# Patient Record
Sex: MALE | Race: WHITE | URBAN - METROPOLITAN AREA
[De-identification: names, ages, dates, MRNs, and addresses within clinical notes are randomized per-mention and may not be internally consistent; named-entity substitution may affect disease eponyms.]

---

## 2017-02-25 DIAGNOSIS — I15.9 SECONDARY HYPERTENSION WITH GOAL BLOOD PRESSURE LESS THAN 130/85: ICD-10-CM

## 2017-02-28 ENCOUNTER — OFFICE VISIT (OUTPATIENT)
Dept: INTERNAL MEDICINE CLINIC | Age: 52
End: 2017-02-28

## 2017-02-28 VITALS
HEIGHT: 68 IN | WEIGHT: 212.8 LBS | HEART RATE: 64 BPM | TEMPERATURE: 97.2 F | SYSTOLIC BLOOD PRESSURE: 122 MMHG | OXYGEN SATURATION: 96 % | RESPIRATION RATE: 16 BRPM | DIASTOLIC BLOOD PRESSURE: 80 MMHG | BODY MASS INDEX: 32.25 KG/M2

## 2017-02-28 DIAGNOSIS — E78.2 MIXED HYPERLIPIDEMIA: ICD-10-CM

## 2017-02-28 DIAGNOSIS — I10 BENIGN HYPERTENSION: Primary | ICD-10-CM

## 2017-02-28 DIAGNOSIS — R73.09 ELEVATED GLUCOSE: ICD-10-CM

## 2017-02-28 DIAGNOSIS — Z79.899 ENCOUNTER FOR LONG-TERM (CURRENT) DRUG USE: ICD-10-CM

## 2017-02-28 PROBLEM — E66.9 OBESITY (BMI 30-39.9): Status: ACTIVE | Noted: 2017-02-28

## 2017-02-28 RX ORDER — INDOMETHACIN 50 MG/1
CAPSULE ORAL
Refills: 2 | COMMUNITY
Start: 2016-12-15 | End: 2018-07-30 | Stop reason: ALTCHOICE

## 2017-02-28 NOTE — MR AVS SNAPSHOT
Visit Information Date & Time Provider Department Dept. Phone Encounter #  
 2/28/2017  9:00 AM Guanaco Meier MD [place] [address] [facility] [phone] Follow-up Instructions Return in about 6 months (around 8/28/2017). Upcoming Health Maintenance Date Due COLONOSCOPY 2/12/1981 DTaP/Tdap/Td series (1 - Tdap) 2/12/1984 Allergies as of 2/28/2017  Review Complete On: 2/28/2017 By: Guanaco Meier MD  
 No Known Allergies Current Immunizations  Reviewed on 2/28/2017 No immunizations on file. Reviewed by Guanaco Meier MD on 2/28/2017 at  9:22 AM  
You Were Diagnosed With   
  
 Codes Comments Mixed hyperlipidemia    -  Primary ICD-10-CM: B83.4 ICD-9-CM: 272.2 Benign hypertension     ICD-10-CM: I10 
ICD-9-CM: 401.1 Elevated glucose     ICD-10-CM: R73.09 
ICD-9-CM: 790.29 Encounter for long-term (current) drug use     ICD-10-CM: Z79.899 ICD-9-CM: V58.69 Vitals BP  
  
  
  
  
  
 122/80 (BP 1 Location: Right arm, BP Patient Position: Sitting) BMI and BSA Data Body Mass Index Body Surface Area  
 32.36 kg/m 2 2.15 m 2 Preferred Pharmacy Pharmacy Name Phone [id number] Nithya Bui, [address] [place] [phone] Your Updated Medication List  
  
   
This list is accurate as of: 2/28/17  9:34 AM.  Always use your most recent med list.  
  
  
  
  
 ASPIRIN PO Take 81 mg by mouth daily. CO Q-10 10 mg Cap Generic drug:  coenzyme q10 Take 1 Tab by mouth daily. diph,pertuss(acel),tetanus vac(PF) 2 Lf-(2.5-5-3-5 mcg)-5Lf/0.5 mL Syrg vaccine Commonly known as:  ADACEL(TDAP ADOLESN/ADULT)(PF)  
0.5 mL by IntraMUSCular route once for 1 dose. FISH OIL PO Take 4,000 mg by mouth daily. indomethacin 50 mg capsule Commonly known as:  INDOCIN  
TAKE 1 CAPSULE BY MOUTH TWICE A DAY FOR GOUT  
  
 metoprolol tartrate 50 mg tablet Commonly known as:  LOPRESSOR  
TAKE 1 TAB BY MOUTH TWO (2) TIMES A DAY. MULTIVITAMIN PO Take  by mouth daily. simvastatin 20 mg tablet Commonly known as:  ZOCOR Take 1 Tab by mouth daily. Prescriptions Printed Refills diph,pertuss,acel,,tetanus vac,PF, (ADACEL,TDAP ADOLESN/ADULT,,PF,) 2 Lf-(2.5-5-3-5 mcg)-5Lf/0.5 mL syrg vaccine 0 Si.5 mL by IntraMUSCular route once for 1 dose. Class: Print Route: IntraMUSCular We Performed the Following HEMOGLOBIN A1C WITH EAG [68486 CPT(R)] METABOLIC PANEL, COMPREHENSIVE [75435 CPT(R)] Follow-up Instructions Return in about 6 months (around 2017). Introducing [facility]! Preston Gonzales introduces [facility] patient portal. Now you can access parts of your medical record, email your doctor's office, and request medication refills online. 1. In your internet browser, go to [email or web address] 2. Click on the First Time User? Click Here link in the Sign In box. You will see the New Member Sign Up page. 3. Enter your [facility] Access Code exactly as it appears below. You will not need to use this code after youve completed the sign-up process. If you do not sign up before the expiration date, you must request a new code. · [facility] Access Code: [email or web address] Expires: 2017  9:33 AM 
 
4. Enter the last four digits of your Social Security Number (xxxx) and Date of Birth (mm/dd/yyyy) as indicated and click Submit. You will be taken to the next sign-up page. 5. Create a Ad Inf[employer]et ID. This will be your [facility] login ID and cannot be changed, so think of one that is secure and easy to remember. 6. Create a [facility] password. You can change your password at any time. 7. Enter your Password Reset Question and Answer. This can be used at a later time if you forget your password. 8. Enter your e-mail address.  You will receive e-mail notification when new information is available in [employer]. 9. Click Sign Up. You can now view and download portions of your medical record. 10. Click the Download Summary menu link to download a portable copy of your medical information. If you have questions, please visit the Frequently Asked Questions section of the [employer] website. Remember, [employer] is NOT to be used for urgent needs. For medical emergencies, dial 911. Now available from your iPhone and Android! Please provide this summary of care documentation to your next provider. Your primary care clinician is listed as Katia Friend. If you have any questions after today's visit, please call [phone].

## 2017-02-28 NOTE — PROGRESS NOTES
Myesha [place] is a 45 y.o. Chief Complaint   Patient presents with    Follow-up     6 Month f/up, Hypertension, Cholesterol     1. Have you been to the ER, urgent care clinic since your last visit? Hospitalized since your last visit? No    2. Have you seen or consulted any other health care providers outside of the [address] [place] [address] since your last visit? Include any pap smears or colon screening.  No

## 2017-02-28 NOTE — LETTER
2/28/2017 9:26 AM 
 
Mr. Brianna Morris [address] [id number] Results for orders placed or performed in visit on 08/29/16 CBC WITH AUTOMATED DIFF Result Value Ref Range WBC 5.3 3.4 - 10.8 x10E3/uL  
 RBC 5.14 4.14 - 5.80 x10E6/uL HGB 15.3 12.6 - 17.7 g/dL HCT 45.5 37.5 - 51.0 % MCV 89 79 - 97 fL  
 MCH 29.8 26.6 - 33.0 pg  
 MCHC 33.6 31.5 - 35.7 g/dL  
 RDW 14.0 12.3 - 15.4 % PLATELET 022 355 - 788 x10E3/uL NEUTROPHILS 50 % Lymphocytes 40 % MONOCYTES 8 % EOSINOPHILS 2 % BASOPHILS 0 %  
 ABS. NEUTROPHILS 2.7 1.4 - 7.0 x10E3/uL Abs Lymphocytes 2.1 0.7 - 3.1 x10E3/uL  
 ABS. MONOCYTES 0.4 0.1 - 0.9 x10E3/uL  
 ABS. EOSINOPHILS 0.1 0.0 - 0.4 x10E3/uL  
 ABS. BASOPHILS 0.0 0.0 - 0.2 x10E3/uL IMMATURE GRANULOCYTES 0 %  
 ABS. IMM. GRANS. 0.0 0.0 - 0.1 x10E3/uL LIPID PANEL Result Value Ref Range Cholesterol, total 115 100 - 199 mg/dL Triglyceride 110 0 - 149 mg/dL HDL Cholesterol 40 >39 mg/dL VLDL, calculated 22 5 - 40 mg/dL LDL, calculated 53 0 - 99 mg/dL TSH REFLEX TO T4 Result Value Ref Range TSH 2.310 0.450 - 4.500 uIU/mL PSA SCREENING (SCREENING) Result Value Ref Range Prostate Specific Ag 0.7 0.0 - 4.0 ng/mL METABOLIC PANEL, COMPREHENSIVE Result Value Ref Range Glucose 109 (H) 65 - 99 mg/dL BUN 16 6 - 24 mg/dL Creatinine 1.24 0.76 - 1.27 mg/dL GFR est non-AA 66 >59 mL/min/1.73 GFR est AA 76 >59 mL/min/1.73  
 BUN/Creatinine ratio 13 9 - 20 Sodium 143 134 - 144 mmol/L Potassium 4.9 3.5 - 5.2 mmol/L Chloride 102 97 - 108 mmol/L  
 CO2 25 18 - 29 mmol/L Calcium 9.4 8.7 - 10.2 mg/dL Protein, total 6.6 6.0 - 8.5 g/dL Albumin 4.2 3.5 - 5.5 g/dL GLOBULIN, TOTAL 2.4 1.5 - 4.5 g/dL A-G Ratio 1.8 1.1 - 2.5 Bilirubin, total 0.6 0.0 - 1.2 mg/dL Alk. phosphatase 62 39 - 117 IU/L  
 AST (SGOT) 20 0 - 40 IU/L  
 ALT (SGPT) 24 0 - 44 IU/L  
CVD REPORT Result Value Ref Range INTERPRETATION Note

## 2017-02-28 NOTE — PROGRESS NOTES
Subjective:      Dulce Hyatt is a 45 y.o. male who presents today for follow up of his hypertension, hyperlipidemia and elevated glucose. Lost a few more pounds with exercise and diet. He denies any chest pain, shortness of breath, syncope, headaches, nausea/vomiting, or any bowel changes. Patient Active Problem List   Diagnosis Code    HTN (hypertension) I10    S/P tonsillectomy Z90.89    Hyperlipidemia E78.5    Colon cancer screening Z12.11    Elevated glucose R73.09    Obesity (BMI 30-39. 9) E66.9     Current Outpatient Prescriptions   Medication Sig Dispense Refill    diph,pertuss,acel,,tetanus vac,PF, (ADACEL,TDAP ADOLESN/ADULT,,PF,) 2 Lf-(2.5-5-3-5 mcg)-5Lf/0.5 mL syrg vaccine 0.5 mL by IntraMUSCular route once for 1 dose. 0.5 mL 0    simvastatin (ZOCOR) 20 mg tablet Take 1 Tab by mouth daily. 30 Tab 0    metoprolol tartrate (LOPRESSOR) 50 mg tablet TAKE 1 TAB BY MOUTH TWO (2) TIMES A DAY. 60 Tab 3    coenzyme q10 (CO Q-10) 10 mg cap Take 1 Tab by mouth daily.  MULTIVITAMIN PO Take  by mouth daily.  ASPIRIN PO Take 81 mg by mouth daily.  DOCOSAHEXANOIC ACID/EPA (FISH OIL PO) Take 4,000 mg by mouth daily.  indomethacin (INDOCIN) 50 mg capsule TAKE 1 CAPSULE BY MOUTH TWICE A DAY FOR GOUT  2        Review of Systems    Pertinent items are noted in HPI. Objective:      Wt Readings from Last 3 Encounters:   02/28/17 212 lb 12.8 oz (96.5 kg)   08/29/16 214 lb (97.1 kg)   12/16/15 218 lb (98.9 kg)     Temp Readings from Last 3 Encounters:   02/28/17 97.2 °F (36.2 °C) (Oral)   08/29/16 97.6 °F (36.4 °C) (Oral)   12/16/15 97.1 °F (36.2 °C) (Oral)     BP Readings from Last 3 Encounters:   02/28/17 122/80   08/29/16 122/62   12/16/15 122/82     Pulse Readings from Last 3 Encounters:   02/28/17 64   08/29/16 89   12/16/15 74      Visit Vitals    /80 (BP 1 Location: Right arm, BP Patient Position: Sitting)    Pulse 64    Temp 97.2 °F (36.2 °C) (Oral)    Resp 16    Ht 5' 8\" (1.727 m)    Wt 212 lb 12.8 oz (96.5 kg)    SpO2 96%    BMI 32.36 kg/m2     General appearance: alert, cooperative, no distress, appears stated age  Head: Normocephalic, without obvious abnormality, atraumatic  Neck: supple, symmetrical, trachea midline, no adenopathy, no carotid bruit and no JVD  Lungs: clear to auscultation bilaterally  Heart: regular rate and rhythm, S1, S2 normal, no murmur, click, rub or gallop  Extremities: extremities normal, atraumatic, no cyanosis or edema  Pulses: 2+ and symmetric    Assessment/Plan:     1. Benign hypertension  -I evaluated and recommended to continue current doses of medications.    - METABOLIC PANEL, COMPREHENSIVE    2. Mixed hyperlipidemia  -last fasting lipid panel done in 2016 controlled. - METABOLIC PANEL, COMPREHENSIVE    3. Elevated glucose  -encouraged him to maintain a low glucose and carbohydrate diet. - METABOLIC PANEL, COMPREHENSIVE  - HEMOGLOBIN A1C WITH EAG    4. Encounter for long-term (current) drug use    - METABOLIC PANEL, COMPREHENSIVE     5. Health Care Maintenance    -patient is due for his TDAP booster. He preferred getting immunization at his local drug store. Script given to patient.   -continue regular exercise and diet modification, encouraged continued weight reduction. Orders Placed This Encounter    METABOLIC PANEL, COMPREHENSIVE    HEMOGLOBIN A1C WITH EAG                  diph,pertuss,acel,,tetanus vac,PF, (ADACEL,TDAP ADOLESN/ADULT,,PF,) 2 Lf-(2.5-5-3-5 mcg)-5Lf/0.5 mL syrg vaccine     Si.5 mL by IntraMUSCular route once for 1 dose. Dispense:  0.5 mL     Refill:  0      Follow-up Disposition:     Follow up in 6 months     Return if symptoms worsen or fail to improve. Advised patient to call back or return to office if symptoms worsen/change/persist.     Discussed expected course/resolution/complications of diagnosis in detail with patient.    Medication risks/benefits/costs/interactions/alternatives discussed with patient. Patient was given an after visit summary which includes diagnoses, current medications, & vitals. Patient expressed understanding with the diagnosis and plan.

## 2017-03-01 LAB
ALBUMIN SERPL-MCNC: 4.3 G/DL (ref 3.5–5.5)
ALBUMIN/GLOB SERPL: 1.8 {RATIO} (ref 1.1–2.5)
ALP SERPL-CCNC: 53 IU/L (ref 39–117)
ALT SERPL-CCNC: 26 IU/L (ref 0–44)
AST SERPL-CCNC: 17 IU/L (ref 0–40)
BILIRUB SERPL-MCNC: 0.7 MG/DL (ref 0–1.2)
BUN SERPL-MCNC: 17 MG/DL (ref 6–24)
BUN/CREAT SERPL: 13 (ref 9–20)
CALCIUM SERPL-MCNC: 9.3 MG/DL (ref 8.7–10.2)
CHLORIDE SERPL-SCNC: 100 MMOL/L (ref 96–106)
CO2 SERPL-SCNC: 26 MMOL/L (ref 18–29)
CREAT SERPL-MCNC: 1.28 MG/DL (ref 0.76–1.27)
EST. AVERAGE GLUCOSE BLD GHB EST-MCNC: 111 MG/DL
GLOBULIN SER CALC-MCNC: 2.4 G/DL (ref 1.5–4.5)
GLUCOSE SERPL-MCNC: 103 MG/DL (ref 65–99)
HBA1C MFR BLD: 5.5 % (ref 4.8–5.6)
POTASSIUM SERPL-SCNC: 4.8 MMOL/L (ref 3.5–5.2)
PROT SERPL-MCNC: 6.7 G/DL (ref 6–8.5)
SODIUM SERPL-SCNC: 141 MMOL/L (ref 134–144)

## 2017-03-10 RX ORDER — METOPROLOL TARTRATE 50 MG/1
TABLET ORAL
Qty: 60 TAB | Refills: 5 | Status: SHIPPED | OUTPATIENT
Start: 2017-03-10 | End: 2017-09-09 | Stop reason: SDUPTHER

## 2017-03-12 RX ORDER — SIMVASTATIN 20 MG/1
20 TABLET, FILM COATED ORAL DAILY
Qty: 30 TAB | Refills: 5 | Status: SHIPPED | OUTPATIENT
Start: 2017-03-12 | End: 2017-09-09 | Stop reason: SDUPTHER

## 2017-08-28 ENCOUNTER — OFFICE VISIT (OUTPATIENT)
Dept: INTERNAL MEDICINE CLINIC | Age: 52
End: 2017-08-28

## 2017-08-28 VITALS
BODY MASS INDEX: 31.28 KG/M2 | DIASTOLIC BLOOD PRESSURE: 85 MMHG | HEIGHT: 68 IN | SYSTOLIC BLOOD PRESSURE: 130 MMHG | OXYGEN SATURATION: 99 % | HEART RATE: 67 BPM | TEMPERATURE: 96.7 F | WEIGHT: 206.4 LBS

## 2017-08-28 DIAGNOSIS — Z23 ENCOUNTER FOR IMMUNIZATION: ICD-10-CM

## 2017-08-28 DIAGNOSIS — Z79.899 ENCOUNTER FOR LONG-TERM (CURRENT) USE OF MEDICATIONS: ICD-10-CM

## 2017-08-28 DIAGNOSIS — Z12.5 PROSTATE CANCER SCREENING: ICD-10-CM

## 2017-08-28 DIAGNOSIS — E78.2 MIXED HYPERLIPIDEMIA: ICD-10-CM

## 2017-08-28 DIAGNOSIS — I10 BENIGN HYPERTENSION: Primary | ICD-10-CM

## 2017-08-28 DIAGNOSIS — R73.09 ELEVATED GLUCOSE: ICD-10-CM

## 2017-08-28 DIAGNOSIS — Z12.11 COLON CANCER SCREENING: Chronic | ICD-10-CM

## 2017-08-28 NOTE — MR AVS SNAPSHOT
Visit Information Date & Time Provider Department Dept. Phone Encounter #  
 8/28/2017  9:20 AM Yoselin Manuel MD [place] [address] [facility] [phone] [id number] Follow-up Instructions Return in about 6 months (around 2/28/2018) for hypertension, hyperlipidemia. Upcoming Health Maintenance Date Due DTaP/Tdap/Td series (1 - Tdap) 2/12/1984 COLONOSCOPY 4/10/2027 Allergies as of 8/28/2017  Review Complete On: 8/28/2017 By: Yoselin Manuel MD  
 No Known Allergies Current Immunizations  Reviewed on 8/28/2017 Name Date Tdap  Incomplete Reviewed by Yoselin Manuel MD on 8/28/2017 at  9:41 AM  
You Were Diagnosed With   
  
 Codes Comments Benign hypertension    -  Primary ICD-10-CM: I10 
ICD-9-CM: 401.1 Mixed hyperlipidemia     ICD-10-CM: E78.2 ICD-9-CM: 272.2 Elevated glucose     ICD-10-CM: R73.09 
ICD-9-CM: 790.29 Encounter for long-term (current) use of medications     ICD-10-CM: Z79.899 ICD-9-CM: V58.69 Prostate cancer screening     ICD-10-CM: Z12.5 ICD-9-CM: V76.44 Colon cancer screening     ICD-10-CM: Z12.11 ICD-9-CM: V76.51 Encounter for immunization     ICD-10-CM: A74 ICD-9-CM: V03.89 Vitals BP Pulse Temp Height(growth percentile) Weight(growth percentile) SpO2  
 130/85 67 96.7 °F (35.9 °C) (Oral) 5' 8\" (1.727 m) 206 lb 6.4 oz (93.6 kg) 99% BMI Smoking Status 31.38 kg/m2 Never Smoker Vitals History BMI and BSA Data Body Mass Index Body Surface Area  
 31.38 kg/m 2 2.12 m 2 Preferred Pharmacy Pharmacy Name Phone [id number] Zackjeffrey Roca, [address] [facility] [phone] Your Updated Medication List  
  
   
This list is accurate as of: 8/28/17  9:54 AM.  Always use your most recent med list.  
  
  
  
  
 ASPIRIN PO Take 81 mg by mouth daily. CO Q-10 10 mg Cap Generic drug:  coenzyme q10 Take 1 Tab by mouth daily. FISH OIL PO Take 4,000 mg by mouth daily. indomethacin 50 mg capsule Commonly known as:  INDOCIN  
TAKE 1 CAPSULE BY MOUTH TWICE A DAY FOR GOUT  
  
 metoprolol tartrate 50 mg tablet Commonly known as:  LOPRESSOR  
TAKE 1 TABLET BY MOUTH TWICE A DAY MULTIVITAMIN PO Take  by mouth daily. OTHER Juice plus 2 tablets 3 times daily  
  
 simvastatin 20 mg tablet Commonly known as:  ZOCOR Take 1 Tab by mouth daily. We Performed the Following CBC WITH AUTOMATED DIFF [66104 CPT(R)] LIPID PANEL [46953 CPT(R)] METABOLIC PANEL, COMPREHENSIVE [81910 CPT(R)] PSA SCREENING (SCREENING) [ [facility]] TETANUS, DIPHTHERIA TOXOIDS AND ACELLULAR PERTUSSIS VACCINE (TDAP), IN INDIVIDS. >=7, IM [id number] CPT(R)] URINALYSIS W/ RFLX MICROSCOPIC [28073 CPT(R)] Follow-up Instructions Return in about 6 months (around 2018) for hypertension, hyperlipidemia. Patient Instructions Vaccine Information Statement Tdap (Tetanus, Diphtheria, Pertussis) Vaccine: What You Need to Know Many Vaccine Information Statements are available in [place] and other languages. See www.immunize.org/vis. Hojas de Información Sobre Vacunas están disponibles en español y en muchos otros idiomas. [facility] 1. Why get vaccinated? Tetanus, diphtheria, and pertussis are very serious diseases. Tdap vaccine can protect us from these diseases. And, Tdap vaccine given to pregnant women can protect  babies against pertussis. TETANUS (Lockjaw) is rare in the [facility] today. It causes painful muscle tightening and stiffness, usually all over the body. ? It can lead to tightening of muscles in the head and neck so you cant open your mouth, swallow, or sometimes even breathe. Tetanus kills about 1 out of 10 people who are infected even after receiving the best medical care. DIPHTHERIA is also rare in the [facility] today. It can cause a thick coating to form in the back of the throat. ? It can lead to breathing problems, heart failure, paralysis, and death. PERTUSSIS (Whooping Cough) causes severe coughing spells, which can cause difficulty breathing, vomiting, and disturbed sleep. ? It can also lead to weight loss, incontinence, and rib fractures. Up to 2 in 100 adolescents and 5 in 100 adults with pertussis are hospitalized or have complications, which could include pneumonia or death. These diseases are caused by bacteria. Diphtheria and pertussis are spread from person to person through secretions from coughing or sneezing. Tetanus enters the body through cuts, scratches, or wounds. Before vaccines, as many as 200,000 cases of diphtheria, 200,000 cases of pertussis, and hundreds of cases of tetanus, were reported in the [place] each year. Since vaccination began, reports of cases for tetanus and diphtheria have dropped by about 99% and for pertussis by about 80%. 2. Tdap vaccine Tdap vaccine can protect adolescents and adults from tetanus, diphtheria, and pertussis. One dose of Tdap is routinely given at age 6 or 15. People who did not get Tdap at that age should get it as soon as possible. Tdap is especially important for health care professionals and anyone having close contact with a baby younger than 12 months. Pregnant women should get a dose of Tdap during every pregnancy, to protect the  from pertussis. Infants are most at risk for severe, life-threatening complications from pertussis. Another vaccine, called Td, protects against tetanus and diphtheria, but not pertussis. A Td booster should be given every 10 years. Tdap may be given as one of these boosters if you have never gotten Tdap before. Tdap may also be given after a severe cut or burn to prevent tetanus infection. Your doctor or the person giving you the vaccine can give you more information. Tdap may safely be given at the same time as other vaccines. 3. Some people should not get this vaccine  A person who has ever had a life-threatening allergic reaction after a previous dose of any diphtheria, tetanus or pertussis containing vaccine, OR has a severe allergy to any part of this vaccine, should not get Tdap vaccine. Tell the person giving the vaccine about any severe allergies.  Anyone who had coma or long repeated seizures within 7 days after a childhood dose of DTP or DTaP, or a previous dose of Tdap, should not get Tdap, unless a cause other than the vaccine was found. They can still get Td.  Talk to your doctor if you: 
- have seizures or another nervous system problem, 
- had severe pain or swelling after any vaccine containing diphtheria, tetanus or pertussis,  
- ever had a condition called Guillain Barré Syndrome (GBS), 
- arent feeling well on the day the shot is scheduled. 4. Risks With any medicine, including vaccines, there is a chance of side effects. These are usually mild and go away on their own. Serious reactions are also possible but are rare. Most people who get Tdap vaccine do not have any problems with it. Mild Problems following Tdap 
(Did not interfere with activities)  Pain where the shot was given (about 3 in 4 adolescents or 2 in 3 adults)  Redness or swelling where the shot was given (about 1 person in 5)  Mild fever of at least 100.4°F (up to about 1 in 25 adolescents or 1 in 100 adults)  Headache (about 3 or 4 people in 10)  Tiredness (about 1 person in 3 or 4)  Nausea, vomiting, diarrhea, stomach ache (up to 1 in 4 adolescents or 1 in 10 adults)  Chills,  sore joints (about 1 person in 10)  Body aches (about 1 person in 3 or 4)  Rash, swollen glands (uncommon) Moderate Problems following Tdap (Interfered with activities, but did not require medical attention)  Pain where the shot was given (up to 1 in 5 or 6)  Redness or swelling where the shot was given (up to about 1 in 16 adolescents or 1 in 12 adults)  Fever over 102°F (about 1 in 100 adolescents or 1 in 250 adults)  Headache (about 1 in 7 adolescents or 1 in 10 adults)  Nausea, vomiting, diarrhea, stomach ache (up to 1 or 3 people in 100)  Swelling of the entire arm where the shot was given (up to about 1 in 500). Severe Problems following Tdap 
(Unable to perform usual activities; required medical attention)  Swelling, severe pain, bleeding, and redness in the arm where the shot was given (rare). Problems that could happen after any vaccine:  People sometimes faint after a medical procedure, including vaccination. Sitting or lying down for about 15 minutes can help prevent fainting, and injuries caused by a fall. Tell your doctor if you feel dizzy, or have vision changes or ringing in the ears.  Some people get severe pain in the shoulder and have difficulty moving the arm where a shot was given. This happens very rarely.  Any medication can cause a severe allergic reaction. Such reactions from a vaccine are very rare, estimated at fewer than 1 in a million doses, and would happen within a few minutes to a few hours after the vaccination. As with any medicine, there is a very remote chance of a vaccine causing a serious injury or death. The safety of vaccines is always being monitored. For more information, visit: www.cdc.gov/vaccinesafety/ 
 
5. What if there is a serious problem? What should I look for?  Look for anything that concerns you, such as signs of a severe allergic reaction, very high fever, or unusual behavior.  
 
 Signs of a severe allergic reaction can include hives, swelling of the face and throat, difficulty breathing, a fast heartbeat, dizziness, and weakness. These would usually start a few minutes to a few hours after the vaccination. What should I do?  If you think it is a severe allergic reaction or other emergency that cant wait, call 9-1-1 or get the person to the nearest hospital. Otherwise, call your doctor.  Afterward, the reaction should be reported to the Vaccine Adverse Event Reporting System (VAERS). Your doctor might file this report, or you can do it yourself through the VAERS web site at www.vaers. [facility].gov, or by calling 5-133.572.3384. VAERS does not give medical advice. 6. The National Vaccine Injury Compensation Program 
 
The [facility] Vaccine Injury Compensation Program (VICP) is a federal program that was created to compensate people who may have been injured by certain vaccines. Persons who believe they may have been injured by a vaccine can learn about the program and about filing a claim by calling 9-625.151.6417 or visiting the [employer] "[employer] website at www.[facility].gov/vaccinecompensation. There is a time limit to file a claim for compensation. 7. How can I learn more?  Ask your doctor. He or she can give you the vaccine package insert or suggest other sources of information.  Call your local or state health department.  Contact the Centers for Disease Control and Prevention (CDC): 
- Call 0-859.496.9390 (1-800-CDC-INFO) or 
- Visit CDCs website at www.cdc.gov/vaccines Vaccine Information Statement Tdap Vaccine 
(2/24/2015) [address] Department of Health and [place] Centers for Disease Control and Prevention Office Use Only Introducing [facility]! [employer] introduces [employer] patient portal. Now you can access parts of your medical record, email your doctor's office, and request medication refills online. 1. In your internet browser, go to [email or web address] 2. Click on the First Time User? Click Here link in the Sign In box.  You will see the New Member Sign Up page. 3. Enter your [employer] Access Code exactly as it appears below. You will not need to use this code after youve completed the sign-up process. If you do not sign up before the expiration date, you must request a new code. · [employer] Access Code: [email or web address] Expires: 11/26/2017  9:51 AM 
 
4. Enter the last four digits of your Social Security Number (xxxx) and Date of Birth (mm/dd/yyyy) as indicated and click Submit. You will be taken to the next sign-up page. 5. Create a [employer]it ID. This will be your [employer] login ID and cannot be changed, so think of one that is secure and easy to remember. 6. Create a [employer] password. You can change your password at any time. 7. Enter your Password Reset Question and Answer. This can be used at a later time if you forget your password. 8. Enter your e-mail address. You will receive e-mail notification when new information is available in [address]. 9. Click Sign Up. You can now view and download portions of your medical record. 10. Click the Download Summary menu link to download a portable copy of your medical information. If you have questions, please visit the Frequently Asked Questions section of the [employer] website. Remember, [employer] is NOT to be used for urgent needs. For medical emergencies, dial 911. Now available from your iPhone and Android! Please provide this summary of care documentation to your next provider. Your primary care clinician is listed as Katia Friend. If you have any questions after today's visit, please call [phone].

## 2017-08-28 NOTE — PROGRESS NOTES
Subjective:      Keon Herrmann is a 45 y.o. male who presents today for follow up of her hypertension and hyperlipidemia and elevated glucose. He is now using a new supplement - Juice Plus. He denies any chest pain, shortness of breath, syncope, headaches, nausea/vomiting, or any bowel changes. No new concerns at this time. Patient Active Problem List   Diagnosis Code    HTN (hypertension) I10    S/P tonsillectomy Z90.89    Hyperlipidemia E78.5    Colon cancer screening Z12.11    Elevated glucose R73.09    Obesity (BMI 30-39. 9) E66.9     Current Outpatient Prescriptions   Medication Sig Dispense Refill    OTHER Juice plus 2 tablets 3 times daily      simvastatin (ZOCOR) 20 mg tablet Take 1 Tab by mouth daily. 30 Tab 5    metoprolol tartrate (LOPRESSOR) 50 mg tablet TAKE 1 TABLET BY MOUTH TWICE A DAY 60 Tab 5    indomethacin (INDOCIN) 50 mg capsule TAKE 1 CAPSULE BY MOUTH TWICE A DAY FOR GOUT  2    coenzyme q10 (CO Q-10) 10 mg cap Take 1 Tab by mouth daily.  MULTIVITAMIN PO Take  by mouth daily.  ASPIRIN PO Take 81 mg by mouth daily.  DOCOSAHEXANOIC ACID/EPA (FISH OIL PO) Take 4,000 mg by mouth daily. Review of Systems    Pertinent items are noted in HPI. Objective:      Wt Readings from Last 3 Encounters:   08/28/17 206 lb 6.4 oz (93.6 kg)   02/28/17 212 lb 12.8 oz (96.5 kg)   08/29/16 214 lb (97.1 kg)     Temp Readings from Last 3 Encounters:   08/28/17 96.7 °F (35.9 °C) (Oral)   02/28/17 97.2 °F (36.2 °C) (Oral)   08/29/16 97.6 °F (36.4 °C) (Oral)     BP Readings from Last 3 Encounters:   08/28/17 156/88   02/28/17 122/80   08/29/16 122/62     Pulse Readings from Last 3 Encounters:   08/28/17 67   02/28/17 64   08/29/16 89       Visit Vitals    /85    Pulse 67    Temp 96.7 °F (35.9 °C) (Oral)    Ht 5' 8\" (1.727 m)    Wt 206 lb 6.4 oz (93.6 kg)    SpO2 99%    BMI 31.38 kg/m2     General appearance: alert, cooperative, no distress, appears stated age  Head: Normocephalic, without obvious abnormality, atraumatic  Neck: supple, symmetrical, trachea midline, no adenopathy, no carotid bruit and no JVD  Lungs: clear to auscultation bilaterally  Heart: regular rate and rhythm, S1, S2 normal, no murmur, click, rub or gallop  Extremities: extremities normal, atraumatic, no cyanosis or edema  Pulses: 2+ and symmetric    Assessment/Plan:     1. Benign hypertension  -I evaluated and recommended to continue current doses of medications. - CBC WITH AUTOMATED DIFF  - METABOLIC PANEL, COMPREHENSIVE  - URINALYSIS W/ RFLX MICROSCOPIC    2. Mixed hyperlipidemia  -due for fasting lipid panel at this time.    -tolerating current dose of simvastatin without any problems.     - METABOLIC PANEL, COMPREHENSIVE  - LIPID PANEL    3. Elevated glucose  -continue low sugar and carb diet. 4. Encounter for long-term (current) use of medications      5. Prostate cancer screening    - PSA SCREENING (SCREENING)    6. Encounter for immunization  -patient received his TDAP without any complications. - Tetanus, diphtheria toxoids and acellular pertussis (TDAP) vaccine, in individuals >=7 years, IM       Orders Placed This Encounter    Tetanus, diphtheria toxoids and acellular pertussis (TDAP) vaccine, in individuals >=7 years, IM    CBC WITH AUTOMATED DIFF    METABOLIC PANEL, COMPREHENSIVE    LIPID PANEL    URINALYSIS W/ RFLX MICROSCOPIC    PSA SCREENING (SCREENING)    OTHER     Sig: Juice plus 2 tablets 3 times daily      Follow-up Disposition:     Follow up in 6 months     Return if symptoms worsen or fail to improve. Advised patient to call back or return to office if symptoms worsen/change/persist.     Discussed expected course/resolution/complications of diagnosis in detail with patient. Medication risks/benefits/costs/interactions/alternatives discussed with patient. Patient was given an after visit summary which includes diagnoses, current medications, & vitals. Patient expressed understanding with the diagnosis and plan.

## 2017-08-28 NOTE — PATIENT INSTRUCTIONS
Vaccine Information Statement     Tdap (Tetanus, Diphtheria, Pertussis) Vaccine: What You Need to Know    Many Vaccine Information Statements are available in Tam[place] and other languages. See www.immunize.org/vis. Hojas de Información Sobre Vacunas están disponibles en español y en muchos otros idiomas. Visite MaylinScale.si    1. Why get vaccinated? Tetanus, diphtheria, and pertussis are very serious diseases. Tdap vaccine can protect us from these diseases. And, Tdap vaccine given to pregnant women can protect  babies against pertussis. TETANUS (Lockjaw) is rare in the [facility] today. It causes painful muscle tightening and stiffness, usually all over the body.  It can lead to tightening of muscles in the head and neck so you cant open your mouth, swallow, or sometimes even breathe. Tetanus kills about 1 out of 10 people who are infected even after receiving the best medical care. DIPHTHERIA is also rare in the [facility] today. It can cause a thick coating to form in the back of the throat.  It can lead to breathing problems, heart failure, paralysis, and death. PERTUSSIS (Whooping Cough) causes severe coughing spells, which can cause difficulty breathing, vomiting, and disturbed sleep.  It can also lead to weight loss, incontinence, and rib fractures. Up to 2 in 100 adolescents and 5 in 100 adults with pertussis are hospitalized or have complications, which could include pneumonia or death. These diseases are caused by bacteria. Diphtheria and pertussis are spread from person to person through secretions from coughing or sneezing. Tetanus enters the body through cuts, scratches, or wounds. Before vaccines, as many as 200,000 cases of diphtheria, 200,000 cases of pertussis, and hundreds of cases of tetanus, were reported in the [place] each year.  Since vaccination began, reports of cases for tetanus and diphtheria have dropped by about 99% and for pertussis by about 80%. 2. Tdap vaccine    Tdap vaccine can protect adolescents and adults from tetanus, diphtheria, and pertussis. One dose of Tdap is routinely given at age 6 or 15. People who did not get Tdap at that age should get it as soon as possible. Tdap is especially important for health care professionals and anyone having close contact with a baby younger than 12 months. Pregnant women should get a dose of Tdap during every pregnancy, to protect the  from pertussis. Infants are most at risk for severe, life-threatening complications from pertussis. Another vaccine, called Td, protects against tetanus and diphtheria, but not pertussis. A Td booster should be given every 10 years. Tdap may be given as one of these boosters if you have never gotten Tdap before. Tdap may also be given after a severe cut or burn to prevent tetanus infection. Your doctor or the person giving you the vaccine can give you more information. Tdap may safely be given at the same time as other vaccines. 3. Some people should not get this vaccine     A person who has ever had a life-threatening allergic reaction after a previous dose of any diphtheria, tetanus or pertussis containing vaccine, OR has a severe allergy to any part of this vaccine, should not get Tdap vaccine. Tell the person giving the vaccine about any severe allergies.  Anyone who had coma or long repeated seizures within 7 days after a childhood dose of DTP or DTaP, or a previous dose of Tdap, should not get Tdap, unless a cause other than the vaccine was found. They can still get Td.  Talk to your doctor if you:  - have seizures or another nervous system problem,  - had severe pain or swelling after any vaccine containing diphtheria, tetanus or pertussis,   - ever had a condition called Guillain Barré Syndrome (GBS),  - arent feeling well on the day the shot is scheduled.     4. Risks    With any medicine, including vaccines, there is a chance of side effects. These are usually mild and go away on their own. Serious reactions are also possible but are rare. Most people who get Tdap vaccine do not have any problems with it. Mild Problems following Tdap  (Did not interfere with activities)   Pain where the shot was given (about 3 in 4 adolescents or 2 in 3 adults)   Redness or swelling where the shot was given (about 1 person in 5)   Mild fever of at least 100.4°F (up to about 1 in 25 adolescents or 1 in 100 adults)   Headache (about 3 or 4 people in 10)   Tiredness (about 1 person in 3 or 4)   Nausea, vomiting, diarrhea, stomach ache (up to 1 in 4 adolescents or 1 in 10 adults)   Chills,  sore joints (about 1 person in 10)   Body aches (about 1 person in 3 or 4)    Rash, swollen glands (uncommon)    Moderate Problems following Tdap  (Interfered with activities, but did not require medical attention)   Pain where the shot was given (up to 1 in 5 or 6)    Redness or swelling where the shot was given (up to about 1 in 16 adolescents or 1 in 12 adults)   Fever over 102°F (about 1 in 100 adolescents or 1 in 250 adults)   Headache (about 1 in 7 adolescents or 1 in 10 adults)   Nausea, vomiting, diarrhea, stomach ache (up to 1 or 3 people in 100)   Swelling of the entire arm where the shot was given (up to about 1 in 500). Severe Problems following Tdap  (Unable to perform usual activities; required medical attention)   Swelling, severe pain, bleeding, and redness in the arm where the shot was given (rare). Problems that could happen after any vaccine:     People sometimes faint after a medical procedure, including vaccination. Sitting or lying down for about 15 minutes can help prevent fainting, and injuries caused by a fall. Tell your doctor if you feel dizzy, or have vision changes or ringing in the ears.      Some people get severe pain in the shoulder and have difficulty moving the arm where a shot was given. This happens very rarely.  Any medication can cause a severe allergic reaction. Such reactions from a vaccine are very rare, estimated at fewer than 1 in a million doses, and would happen within a few minutes to a few hours after the vaccination. As with any medicine, there is a very remote chance of a vaccine causing a serious injury or death. The safety of vaccines is always being monitored. For more information, visit: www.cdc.gov/vaccinesafety/    5. What if there is a serious problem? What should I look for?  Look for anything that concerns you, such as signs of a severe allergic reaction, very high fever, or unusual behavior.  Signs of a severe allergic reaction can include hives, swelling of the face and throat, difficulty breathing, a fast heartbeat, dizziness, and weakness. These would usually start a few minutes to a few hours after the vaccination. What should I do?  If you think it is a severe allergic reaction or other emergency that cant wait, call 9-1-1 or get the person to the nearest hospital. Otherwise, call your doctor.  Afterward, the reaction should be reported to the Vaccine Adverse Event Reporting System (VAERS). Your doctor might file this report, or you can do it yourself through the VAERS web site at www.vaers. [facility].gov, or by calling 1-648.675.2834. [employer] does not give medical advice. 6. The National Vaccine Injury Compensation Program    The [facility] Vaccine Injury Compensation Program (VICP) is a federal program that was created to compensate people who may have been injured by certain vaccines. Persons who believe they may have been injured by a vaccine can learn about the program and about filing a claim by calling 9-722.745.3148 or visiting the [employer] website at www.[facility].gov/vaccinecompensation. There is a time limit to file a claim for compensation. 7. How can I learn more?  Ask your doctor.  He or she can give you the vaccine package insert or suggest other sources of information.  Call your local or state health department.  Contact the Centers for Disease Control and Prevention (CDC):  - Call 1-260.694.4278 (1-800-CDC-INFO) or  - Visit CDCs website at www.cdc.gov/vaccines      Vaccine Information Statement   Tdap Vaccine  (2/24/2015)  42 STEVEN Tapia [id number]    Department of Health and Human Services  Centers for Disease Control and Prevention    Office Use Only

## 2017-08-29 LAB
ALBUMIN SERPL-MCNC: 4.4 G/DL (ref 3.5–5.5)
ALBUMIN/GLOB SERPL: 1.6 {RATIO} (ref 1.2–2.2)
ALP SERPL-CCNC: 59 IU/L (ref 39–117)
ALT SERPL-CCNC: 25 IU/L (ref 0–44)
APPEARANCE UR: CLEAR
AST SERPL-CCNC: 20 IU/L (ref 0–40)
BASOPHILS # BLD AUTO: 0 X10E3/UL (ref 0–0.2)
BASOPHILS NFR BLD AUTO: 0 %
BILIRUB SERPL-MCNC: 0.7 MG/DL (ref 0–1.2)
BILIRUB UR QL STRIP: NEGATIVE
BUN SERPL-MCNC: 16 MG/DL (ref 6–24)
BUN/CREAT SERPL: 16 (ref 9–20)
CALCIUM SERPL-MCNC: 9.5 MG/DL (ref 8.7–10.2)
CHLORIDE SERPL-SCNC: 99 MMOL/L (ref 96–106)
CHOLEST SERPL-MCNC: 121 MG/DL (ref 100–199)
CO2 SERPL-SCNC: 28 MMOL/L (ref 18–29)
COLOR UR: YELLOW
CREAT SERPL-MCNC: 1.02 MG/DL (ref 0.76–1.27)
EOSINOPHIL # BLD AUTO: 0.1 X10E3/UL (ref 0–0.4)
EOSINOPHIL NFR BLD AUTO: 1 %
ERYTHROCYTE [DISTWIDTH] IN BLOOD BY AUTOMATED COUNT: 13.9 % (ref 12.3–15.4)
GLOBULIN SER CALC-MCNC: 2.8 G/DL (ref 1.5–4.5)
GLUCOSE SERPL-MCNC: 98 MG/DL (ref 65–99)
GLUCOSE UR QL: NEGATIVE
HCT VFR BLD AUTO: 47.8 % (ref 37.5–51)
HDLC SERPL-MCNC: 48 MG/DL
HGB BLD-MCNC: 16.1 G/DL (ref 12.6–17.7)
HGB UR QL STRIP: NEGATIVE
IMM GRANULOCYTES # BLD: 0 X10E3/UL (ref 0–0.1)
IMM GRANULOCYTES NFR BLD: 0 %
INTERPRETATION, 910389: NORMAL
KETONES UR QL STRIP: NEGATIVE
LDLC SERPL CALC-MCNC: 57 MG/DL (ref 0–99)
LEUKOCYTE ESTERASE UR QL STRIP: NEGATIVE
LYMPHOCYTES # BLD AUTO: 2.1 X10E3/UL (ref 0.7–3.1)
LYMPHOCYTES NFR BLD AUTO: 36 %
MCH RBC QN AUTO: 30.4 PG (ref 26.6–33)
MCHC RBC AUTO-ENTMCNC: 33.7 G/DL (ref 31.5–35.7)
MCV RBC AUTO: 90 FL (ref 79–97)
MICRO URNS: NORMAL
MONOCYTES # BLD AUTO: 0.5 X10E3/UL (ref 0.1–0.9)
MONOCYTES NFR BLD AUTO: 8 %
NEUTROPHILS # BLD AUTO: 3.1 X10E3/UL (ref 1.4–7)
NEUTROPHILS NFR BLD AUTO: 55 %
NITRITE UR QL STRIP: NEGATIVE
PH UR STRIP: 6.5 [PH] (ref 5–7.5)
PLATELET # BLD AUTO: 192 X10E3/UL (ref 150–379)
POTASSIUM SERPL-SCNC: 4.8 MMOL/L (ref 3.5–5.2)
PROT SERPL-MCNC: 7.2 G/DL (ref 6–8.5)
PROT UR QL STRIP: NORMAL
PSA SERPL-MCNC: 0.8 NG/ML (ref 0–4)
RBC # BLD AUTO: 5.29 X10E6/UL (ref 4.14–5.8)
SODIUM SERPL-SCNC: 140 MMOL/L (ref 134–144)
SP GR UR: 1.03 (ref 1–1.03)
TRIGL SERPL-MCNC: 82 MG/DL (ref 0–149)
UROBILINOGEN UR STRIP-MCNC: 0.2 MG/DL (ref 0.2–1)
VLDLC SERPL CALC-MCNC: 16 MG/DL (ref 5–40)
WBC # BLD AUTO: 5.7 X10E3/UL (ref 3.4–10.8)

## 2017-09-10 RX ORDER — METOPROLOL TARTRATE 50 MG/1
TABLET ORAL
Qty: 60 TAB | Refills: 5 | Status: SHIPPED | OUTPATIENT
Start: 2017-09-10 | End: 2018-03-08 | Stop reason: SDUPTHER

## 2017-09-10 RX ORDER — SIMVASTATIN 20 MG/1
TABLET, FILM COATED ORAL
Qty: 30 TAB | Refills: 5 | Status: SHIPPED | OUTPATIENT
Start: 2017-09-10 | End: 2018-03-08 | Stop reason: SDUPTHER

## 2018-07-30 ENCOUNTER — OFFICE VISIT (OUTPATIENT)
Dept: INTERNAL MEDICINE CLINIC | Age: 53
End: 2018-07-30

## 2018-07-30 VITALS
OXYGEN SATURATION: 99 % | SYSTOLIC BLOOD PRESSURE: 132 MMHG | DIASTOLIC BLOOD PRESSURE: 86 MMHG | RESPIRATION RATE: 14 BRPM | TEMPERATURE: 97.8 F | HEART RATE: 60 BPM | HEIGHT: 69 IN | WEIGHT: 213 LBS | BODY MASS INDEX: 31.55 KG/M2

## 2018-07-30 DIAGNOSIS — E66.9 OBESITY (BMI 30-39.9): ICD-10-CM

## 2018-07-30 DIAGNOSIS — Z12.5 PROSTATE CANCER SCREENING: ICD-10-CM

## 2018-07-30 DIAGNOSIS — I10 BENIGN HYPERTENSION: Primary | ICD-10-CM

## 2018-07-30 DIAGNOSIS — R73.09 ELEVATED GLUCOSE: ICD-10-CM

## 2018-07-30 DIAGNOSIS — Z79.899 ENCOUNTER FOR LONG-TERM (CURRENT) USE OF MEDICATIONS: ICD-10-CM

## 2018-07-30 DIAGNOSIS — E78.2 MIXED HYPERLIPIDEMIA: ICD-10-CM

## 2018-07-30 NOTE — PROGRESS NOTES
Subjective:  
  
Martina Fu is a 52 y.o. male who presents today for follow up of his hypertension, hyperlipidemia and elevated glucose. Exercise - 2-3 times per week. No new concerns. He denies any chest pain, shortness of breath, syncope, headaches, nausea/vomiting, or any bowel changes. Patient Active Problem List  
Diagnosis Code  
 HTN (hypertension) I10  
 S/P tonsillectomy Z90.89  Hyperlipidemia E78.5  Colon cancer screening Z12.11  
 Elevated glucose R73.09  
 Obesity (BMI 30-39. 9) E66.9 Current Outpatient Prescriptions Medication Sig Dispense Refill  simvastatin (ZOCOR) 20 mg tablet Take 1 Tab by mouth nightly. Need office visit for further refills 30 Tab 0  
 metoprolol tartrate (LOPRESSOR) 50 mg tablet Take 1 Tab by mouth two (2) times a day. Need office visit for further refills 60 Tab 0  
 OTHER Juice plus 2 tablets 3 times daily  coenzyme q10 (CO Q-10) 10 mg cap Take 1 Tab by mouth daily.  MULTIVITAMIN PO Take  by mouth daily.  ASPIRIN PO Take 81 mg by mouth daily.  DOCOSAHEXANOIC ACID/EPA (FISH OIL PO) Take 4,000 mg by mouth daily. Review of Systems Pertinent items are noted in HPI. Objective: Wt Readings from Last 3 Encounters:  
07/30/18 213 lb (96.6 kg) 08/28/17 206 lb 6.4 oz (93.6 kg) 02/28/17 212 lb 12.8 oz (96.5 kg) Temp Readings from Last 3 Encounters:  
07/30/18 97.8 °F (36.6 °C) (Oral) 08/28/17 96.7 °F (35.9 °C) (Oral) 02/28/17 97.2 °F (36.2 °C) (Oral) BP Readings from Last 3 Encounters:  
07/30/18 132/86  
08/28/17 130/85  
02/28/17 122/80 Pulse Readings from Last 3 Encounters:  
07/30/18 60  
08/28/17 67  
02/28/17 64 Visit Vitals  /86 (BP 1 Location: Left arm, BP Patient Position: Sitting)  Pulse 60  Temp 97.8 °F (36.6 °C) (Oral)  Resp 14  
 Ht 5' 9\" (1.753 m)  Wt 213 lb (96.6 kg)  SpO2 99%  BMI 31.45 kg/m2 General appearance: alert, cooperative, no distress, appears stated age Head: Normocephalic, without obvious abnormality, atraumatic Neck: supple, symmetrical, trachea midline, no adenopathy, no carotid bruit and no JVD Lungs: clear to auscultation bilaterally Heart: regular rate and rhythm, S1, S2 normal, no murmur, click, rub or gallop Abdomen: soft, non-tender. Bowel sounds normal. No masses,  no organomegaly Extremities: extremities normal, atraumatic, no cyanosis or edema Pulses: 2+ and symmetric Assessment/Plan: 1. Benign hypertension 
-I evaluated and recommended to continue current doses of medications. - CBC WITH AUTOMATED DIFF 
- METABOLIC PANEL, COMPREHENSIVE 
- UA/M W/RFLX CULTURE, ROUTINE 2. Mixed hyperlipidemia 
-encouraged maintaining a low fat diet. - CBC WITH AUTOMATED DIFF 
- METABOLIC PANEL, COMPREHENSIVE 
- LIPID PANEL 3. Elevated glucose 
-need to reduce sugars and carbs in diet. - METABOLIC PANEL, COMPREHENSIVE 
- HEMOGLOBIN A1C WITH EAG 
 
4. Obesity (BMI 30-39.9) -discussed with patient that he has several risk factors for increased risk of developing cardiovascular diseases - over 50, elevated glucose, hypertension and hyperlipidemia. I encouraged him to make changes in his lifestyle especially pay attention to his diet and the necessity to work on weight reduction. He stated understanding. 5. Encounter for long-term (current) use of medications 
 
- CBC WITH AUTOMATED DIFF 
- METABOLIC PANEL, COMPREHENSIVE 
- LIPID PANEL 
- HEMOGLOBIN A1C WITH EAG 
- UA/M W/RFLX CULTURE, ROUTINE 
- PSA SCREENING (SCREENING) 6. Prostate cancer screening - PSA SCREENING (SCREENING) Follow-up Disposition:  
 
Follow up in 6 months Return if symptoms worsen or fail to improve. Advised patient to call back or return to office if symptoms worsen/change/persist.  
 
Discussed expected course/resolution/complications of diagnosis in detail with patient.   
Medication risks/benefits/costs/interactions/alternatives discussed with patient. Patient was given an after visit summary which includes diagnoses, current medications, & vitals. Patient expressed understanding with the diagnosis and plan.

## 2018-07-30 NOTE — PROGRESS NOTES
Reviewed record in preparation for visit and have obtained necessary documentation. Identified pt with two pt identifiers(name and ). There are no preventive care reminders to display for this patient. No chief complaint on file. Wt Readings from Last 3 Encounters:  
18 213 lb (96.6 kg) 17 206 lb 6.4 oz (93.6 kg) 17 212 lb 12.8 oz (96.5 kg) Temp Readings from Last 3 Encounters:  
18 97.8 °F (36.6 °C) (Oral) 17 96.7 °F (35.9 °C) (Oral) 17 97.2 °F (36.2 °C) (Oral) BP Readings from Last 3 Encounters:  
18 132/86  
17 130/85  
17 122/80 Pulse Readings from Last 3 Encounters:  
18 60  
17 67  
17 64 Learning Assessment: 
:  
 
Learning Assessment 2018 PRIMARY LEARNER Patient Patient HIGHEST LEVEL OF EDUCATION - PRIMARY LEARNER  4 YEARS OF COLLEGE 4 YEARS OF COLLEGE  
BARRIERS PRIMARY LEARNER NONE NONE  
CO-LEARNER CAREGIVER No No  
PRIMARY LANGUAGE ENGLISH ENGLISH  NEED - No  
LEARNER PREFERENCE PRIMARY READING OTHER (COMMENT) LEARNING SPECIAL TOPICS - no ANSWERED BY self patinet RELATIONSHIP SELF SELF Depression Screening: 
:  
 
PHQ over the last two weeks 2018 Little interest or pleasure in doing things Not at all Feeling down, depressed, irritable, or hopeless Not at all Total Score PHQ 2 0 Fall Risk Assessment: 
:  
 
No flowsheet data found. Abuse Screening: 
:  
 
Abuse Screening Questionnaire 2015 Do you ever feel afraid of your partner? N N N N Are you in a relationship with someone who physically or mentally threatens you? N N N N Is it safe for you to go home? Vanessa Cisneros Coordination of Care Questionnaire: 
:  
 
1) Have you been to an emergency room, urgent care clinic since your last visit? no  
Hospitalized since your last visit? no          
 
2) Have you seen or consulted any other health care providers outside of [address] [place] [address] since your last visit? no  (Include any pap smears or colon screenings in this section.) 3) Do you have an Advance Directive on file? no 
 
4) Are you interested in receiving information on Advance Directives?  NO

## 2018-07-30 NOTE — MR AVS SNAPSHOT
[address], [address] [facility] 
[phone] Patient: Skyla Mcconnell MRN: [id number] :1965 Visit Information Date & Time Provider Department Dept. Phone Encounter #  
 2018 11:40 AM [phone] Follow-up Instructions Return in about 6 months (around 2019) for hypertension, hyperlipidemia, elevated glucose. Upcoming Health Maintenance Date Due Influenza Age 5 to Adult 2018 COLONOSCOPY 4/10/2027 DTaP/Tdap/Td series (2 - Td) 2027 Allergies as of 2018  Review Complete On: 2018 By: Himanshu Jensen MD  
 No Known Allergies Current Immunizations  Reviewed on 2017 Name Date Tdap 2017 Not reviewed this visit You Were Diagnosed With   
  
 Codes Comments Benign hypertension    -  Primary ICD-10-CM: I10 
ICD-9-CM: 401.1 Mixed hyperlipidemia     ICD-10-CM: E78.2 ICD-9-CM: 272.2 Elevated glucose     ICD-10-CM: R73.09 
ICD-9-CM: 790.29 Obesity (BMI 30-39. 9)     ICD-10-CM: E66.9 ICD-9-CM: 278.00 Encounter for long-term (current) use of medications     ICD-10-CM: Z79.899 ICD-9-CM: V58.69 Prostate cancer screening     ICD-10-CM: Z12.5 ICD-9-CM: V76.44 Vitals BP Pulse Temp Resp Height(growth percentile) Weight(growth percentile) 132/86 (BP 1 Location: Left arm, BP Patient Position: Sitting) 60 97.8 °F (36.6 °C) (Oral) 14 5' 9\" (1.753 m) 213 lb (96.6 kg) SpO2 BMI Smoking Status 99% 31.45 kg/m2 Never Smoker Vitals History BMI and BSA Data Body Mass Index Body Surface Area  
 31.45 kg/m 2 2.17 m 2 Preferred Pharmacy Pharmacy Name Phone [id number] [address], [address] [facility] [phone] Your Updated Medication List  
  
   
 This list is accurate as of 18 12:21 PM.  Always use your most recent med list.  
  
  
  
  
 ASPIRIN PO Take 81 mg by mouth daily. CO Q-10 10 mg Cap Generic drug:  coenzyme q10 Take 1 Tab by mouth daily. FISH OIL PO Take 4,000 mg by mouth daily. metoprolol tartrate 50 mg tablet Commonly known as:  LOPRESSOR Take 1 Tab by mouth two (2) times a day. Need office visit for further refills MULTIVITAMIN PO Take  by mouth daily. OTHER Juice plus 2 tablets 3 times daily  
  
 simvastatin 20 mg tablet Commonly known as:  ZOCOR Take 1 Tab by mouth nightly. Need office visit for further refills  
  
 varicella-zoster recombinant (PF) 50 mcg/0.5 mL Susr injection Commonly known as:  SHINGRIX  
0.5 mL by IntraMUSCular route once for 1 dose. Repeat in 2-4 months Prescriptions Printed Refills  
 varicella-zoster recombinant, PF, (SHINGRIX) 50 mcg/0.5 mL susr injection 1 Si.5 mL by IntraMUSCular route once for 1 dose. Repeat in 2-4 months Class: Print Route: IntraMUSCular We Performed the Following CBC WITH AUTOMATED DIFF [29897 CPT(R)] HEMOGLOBIN A1C WITH EAG [69397 CPT(R)] LIPID PANEL [99688 CPT(R)] METABOLIC PANEL, COMPREHENSIVE [45480 CPT(R)] PSA SCREENING (SCREENING) [ [facility]] UA/M W/RFLX CULTURE, ROUTINE [[id number] Custom] Follow-up Instructions Return in about 6 months (around 2019) for hypertension, hyperlipidemia, elevated glucose. Introducing [facility]! Macario Ferreira introduces [employer] patient portal. Now you can access parts of your medical record, email your doctor's office, and request medication refills online. 1. In your internet browser, go to [email or web address] 2. Click on the First Time User? Click Here link in the Sign In box. You will see the New Member Sign Up page. 3. Enter your [employer] Access Code exactly as it appears below. You will not need to use this code after youve completed the sign-up process. If you do not sign up before the expiration date, you must request a new code. · [employer] Access Code: [email or web address] Expires: 10/28/2018 11:33 AM 
 
4. Enter the last four digits of your Social Security Number (xxxx) and Date of Birth (mm/dd/yyyy) as indicated and click Submit. You will be taken to the next sign-up page. 5. Create a [employer] ID. This will be your [employer] login ID and cannot be changed, so think of one that is secure and easy to remember. 6. Create a [employer] password. You can change your password at any time. 7. Enter your Password Reset Question and Answer. This can be used at a later time if you forget your password. 8. Enter your e-mail address. You will receive e-mail notification when new information is available in [address]. 9. Click Sign Up. You can now view and download portions of your medical record. 10. Click the Download Summary menu link to download a portable copy of your medical information. If you have questions, please visit the Frequently Asked Questions section of the [employer] website. Remember, [employer] is NOT to be used for urgent needs. For medical emergencies, dial 911. Now available from your iPhone and Android! Please provide this summary of care documentation to your next provider. Your primary care clinician is listed as Katia Friend. If you have any questions after today's visit, please call [phone].

## 2018-07-31 LAB
ALBUMIN SERPL-MCNC: 4.4 G/DL (ref 3.5–5.5)
ALBUMIN/GLOB SERPL: 1.8 {RATIO} (ref 1.2–2.2)
ALP SERPL-CCNC: 56 IU/L (ref 39–117)
ALT SERPL-CCNC: 27 IU/L (ref 0–44)
APPEARANCE UR: CLEAR
AST SERPL-CCNC: 21 IU/L (ref 0–40)
BACTERIA #/AREA URNS HPF: NORMAL /[HPF]
BASOPHILS # BLD AUTO: 0 X10E3/UL (ref 0–0.2)
BASOPHILS NFR BLD AUTO: 0 %
BILIRUB SERPL-MCNC: 1 MG/DL (ref 0–1.2)
BILIRUB UR QL STRIP: NEGATIVE
BUN SERPL-MCNC: 14 MG/DL (ref 6–24)
BUN/CREAT SERPL: 13 (ref 9–20)
CALCIUM SERPL-MCNC: 9.3 MG/DL (ref 8.7–10.2)
CASTS URNS QL MICRO: NORMAL /LPF
CHLORIDE SERPL-SCNC: 102 MMOL/L (ref 96–106)
CHOLEST SERPL-MCNC: 149 MG/DL (ref 100–199)
CO2 SERPL-SCNC: 24 MMOL/L (ref 20–29)
COLOR UR: YELLOW
CREAT SERPL-MCNC: 1.06 MG/DL (ref 0.76–1.27)
EOSINOPHIL # BLD AUTO: 0.1 X10E3/UL (ref 0–0.4)
EOSINOPHIL NFR BLD AUTO: 1 %
EPI CELLS #/AREA URNS HPF: NORMAL /HPF
ERYTHROCYTE [DISTWIDTH] IN BLOOD BY AUTOMATED COUNT: 13.5 % (ref 12.3–15.4)
EST. AVERAGE GLUCOSE BLD GHB EST-MCNC: 108 MG/DL
GLOBULIN SER CALC-MCNC: 2.5 G/DL (ref 1.5–4.5)
GLUCOSE SERPL-MCNC: 86 MG/DL (ref 65–99)
GLUCOSE UR QL: NEGATIVE
HBA1C MFR BLD: 5.4 % (ref 4.8–5.6)
HCT VFR BLD AUTO: 47.2 % (ref 37.5–51)
HDLC SERPL-MCNC: 45 MG/DL
HGB BLD-MCNC: 16 G/DL (ref 13–17.7)
HGB UR QL STRIP: NEGATIVE
IMM GRANULOCYTES # BLD: 0 X10E3/UL (ref 0–0.1)
IMM GRANULOCYTES NFR BLD: 0 %
INTERPRETATION, 910389: NORMAL
KETONES UR QL STRIP: NEGATIVE
LDLC SERPL CALC-MCNC: 74 MG/DL (ref 0–99)
LEUKOCYTE ESTERASE UR QL STRIP: NEGATIVE
LYMPHOCYTES # BLD AUTO: 2.8 X10E3/UL (ref 0.7–3.1)
LYMPHOCYTES NFR BLD AUTO: 40 %
MCH RBC QN AUTO: 30.2 PG (ref 26.6–33)
MCHC RBC AUTO-ENTMCNC: 33.9 G/DL (ref 31.5–35.7)
MCV RBC AUTO: 89 FL (ref 79–97)
MICRO URNS: NORMAL
MICRO URNS: NORMAL
MONOCYTES # BLD AUTO: 0.5 X10E3/UL (ref 0.1–0.9)
MONOCYTES NFR BLD AUTO: 7 %
MUCOUS THREADS URNS QL MICRO: PRESENT
NEUTROPHILS # BLD AUTO: 3.5 X10E3/UL (ref 1.4–7)
NEUTROPHILS NFR BLD AUTO: 52 %
NITRITE UR QL STRIP: NEGATIVE
PH UR STRIP: 5 [PH] (ref 5–7.5)
PLATELET # BLD AUTO: 192 X10E3/UL (ref 150–379)
POTASSIUM SERPL-SCNC: 5 MMOL/L (ref 3.5–5.2)
PROT SERPL-MCNC: 6.9 G/DL (ref 6–8.5)
PROT UR QL STRIP: NEGATIVE
PSA SERPL-MCNC: 0.7 NG/ML (ref 0–4)
RBC # BLD AUTO: 5.29 X10E6/UL (ref 4.14–5.8)
RBC #/AREA URNS HPF: NORMAL /HPF
SODIUM SERPL-SCNC: 140 MMOL/L (ref 134–144)
SP GR UR: 1.03 (ref 1–1.03)
TRIGL SERPL-MCNC: 150 MG/DL (ref 0–149)
URINALYSIS REFLEX, 377202: NORMAL
UROBILINOGEN UR STRIP-MCNC: 0.2 MG/DL (ref 0.2–1)
VLDLC SERPL CALC-MCNC: 30 MG/DL (ref 5–40)
WBC # BLD AUTO: 6.9 X10E3/UL (ref 3.4–10.8)
WBC #/AREA URNS HPF: NORMAL /HPF

## 2019-01-30 ENCOUNTER — OFFICE VISIT (OUTPATIENT)
Dept: INTERNAL MEDICINE CLINIC | Age: 54
End: 2019-01-30

## 2019-01-30 VITALS
SYSTOLIC BLOOD PRESSURE: 126 MMHG | WEIGHT: 213 LBS | DIASTOLIC BLOOD PRESSURE: 90 MMHG | BODY MASS INDEX: 31.55 KG/M2 | HEART RATE: 71 BPM | OXYGEN SATURATION: 97 % | HEIGHT: 69 IN | TEMPERATURE: 97.7 F | RESPIRATION RATE: 18 BRPM

## 2019-01-30 DIAGNOSIS — E78.2 MIXED HYPERLIPIDEMIA: ICD-10-CM

## 2019-01-30 DIAGNOSIS — I10 BENIGN HYPERTENSION: Primary | ICD-10-CM

## 2019-01-30 DIAGNOSIS — R73.09 ELEVATED GLUCOSE: ICD-10-CM

## 2019-01-30 DIAGNOSIS — Z79.899 ENCOUNTER FOR LONG-TERM (CURRENT) USE OF MEDICATIONS: ICD-10-CM

## 2019-01-30 NOTE — PROGRESS NOTES
Reviewed record in preparation for visit and have obtained necessary documentation. Identified pt with two pt identifiers(name and ). Health Maintenance Due Topic  Shingrix Vaccine Age 50> (1 of 2)  Influenza Age 5 to Adult Chief Complaint Patient presents with  Hypertension 6 mth f/u  Cholesterol Problem 6mth f/u Wt Readings from Last 3 Encounters:  
19 213 lb (96.6 kg) 18 213 lb (96.6 kg) 17 206 lb 6.4 oz (93.6 kg) Temp Readings from Last 3 Encounters:  
18 97.8 °F (36.6 °C) (Oral) 17 96.7 °F (35.9 °C) (Oral) 17 97.2 °F (36.2 °C) (Oral) BP Readings from Last 3 Encounters:  
18 132/86  
17 130/85  
17 122/80 Pulse Readings from Last 3 Encounters:  
18 60  
17 67  
17 64 Learning Assessment: 
:  
 
Learning Assessment 2019 PRIMARY LEARNER Patient Patient Patient HIGHEST LEVEL OF EDUCATION - PRIMARY LEARNER  4 YEARS OF COLLEGE 4 YEARS OF COLLEGE 4 YEARS OF COLLEGE  
BARRIERS PRIMARY LEARNER NONE NONE NONE  
CO-LEARNER CAREGIVER - No No  
PRIMARY LANGUAGE ENGLISH ENGLISH ENGLISH  NEED - - No  
LEARNER PREFERENCE PRIMARY DEMONSTRATION READING OTHER (COMMENT) LEARNING SPECIAL TOPICS - - no ANSWERED BY patient self patinet RELATIONSHIP SELF SELF SELF Depression Screening: 
:  
 
PHQ over the last two weeks 2019 Little interest or pleasure in doing things Not at all Feeling down, depressed, irritable, or hopeless Not at all Total Score PHQ 2 0 Fall Risk Assessment: 
:  
 
Fall Risk Assessment, last 12 mths 2019 Able to walk? Yes Fall in past 12 months? No  
 
 
Abuse Screening: 
:  
 
Abuse Screening Questionnaire 2015 Do you ever feel afraid of your partner? N N N N N Are you in a relationship with someone who physically or mentally threatens you? N N N N N Is it safe for you to go home? [place] Coordination of Care Questionnaire: 
:  
 
1) Have you been to an emergency room, urgent care clinic since your last visit? no  
Hospitalized since your last visit? no          
 
2) Have you seen or consulted any other health care providers outside of [address] [place] [address] since your last visit? no  (Include any pap smears or colon screenings in this section.) 3) Do you have an Advance Directive on file? no 
 
4) Are you interested in receiving information on Advance Directives? NO Patient is accompanied by self I have received verbal consent from Lacey Viramontes to discuss any/all medical information while they are present in the room.

## 2019-01-30 NOTE — PROGRESS NOTES
Subjective:  
  
Severa Rankin is a 52 y.o. male who presents today for follow up of his hypertension, hyperlipidemia and elevated glucose. He continues to exercise regularly. He denies any chest pain, shortness of breath, syncope, headaches, nausea/vomiting, or any bowel changes. Patient Active Problem List  
Diagnosis Code  
 HTN (hypertension) I10  
 S/P tonsillectomy Z90.89  Hyperlipidemia E78.5  Colon cancer screening Z12.11  
 Elevated glucose R73.09  
 Obesity (BMI 30-39. 9) E66.9 Current Outpatient Medications Medication Sig Dispense Refill  varicella-zoster recombinant, PF, (SHINGRIX) 50 mcg/0.5 mL susr injection 0.5 mL by IntraMUSCular route once for 1 dose. Repeat in 2-6 months 0.5 mL 1  simvastatin (ZOCOR) 20 mg tablet Take 1 Tab by mouth daily. 30 Tab 5  
 metoprolol tartrate (LOPRESSOR) 50 mg tablet Take 1 Tab by mouth two (2) times a day. 60 Tab 5  
 OTHER Juice plus 2 tablets 3 times daily  coenzyme q10 (CO Q-10) 10 mg cap Take 1 Tab by mouth daily.  MULTIVITAMIN PO Take  by mouth daily.  ASPIRIN PO Take 81 mg by mouth daily.  DOCOSAHEXANOIC ACID/EPA (FISH OIL PO) Take 4,000 mg by mouth daily. Review of Systems Pertinent items are noted in HPI. Objective: Wt Readings from Last 3 Encounters:  
01/30/19 213 lb (96.6 kg) 07/30/18 213 lb (96.6 kg) 08/28/17 206 lb 6.4 oz (93.6 kg) Visit Vitals /90 (BP 1 Location: Left arm, BP Patient Position: Sitting) Pulse 71 Temp 97.7 °F (36.5 °C) (Oral) Resp 18 Ht 5' 9\" (1.753 m) Wt 213 lb (96.6 kg) SpO2 97% BMI 31.45 kg/m² General appearance: alert, cooperative, no distress, appears stated age Head: Normocephalic, without obvious abnormality, atraumatic Ears: normal TM's and external ear canals AU Neck: supple, symmetrical, trachea midline, no adenopathy, no carotid bruit and no JVD Lungs: clear to auscultation bilaterally Heart: regular rate and rhythm, S1, S2 normal, no murmur, click, rub or gallop Extremities: extremities normal, atraumatic, no cyanosis or edema Pulses: 2+ and symmetric Assessment/Plan: 1. Benign hypertension 
-I evaluated and recommended to continue current doses of medications. 2. Mixed hyperlipidemia 
-due for fasting lipid panel at next visit. 3. Elevated glucose 
-continue to lower sugar and carbs in diet and work on weight reduction 4. Encounter for long-term (current) use of medications 5. Health Care Maintenance   
-recommended getting the shingles vaccine. A prescription for Shingrix was given to the patient. Follow-up Disposition:  
 
Follow up in 6 months Return if symptoms worsen or fail to improve. Advised patient to call back or return to office if symptoms worsen/change/persist.  
 
Discussed expected course/resolution/complications of diagnosis in detail with patient. Medication risks/benefits/costs/interactions/alternatives discussed with patient. Patient was given an after visit summary which includes diagnoses, current medications, & vitals. Patient expressed understanding with the diagnosis and plan.

## 2019-03-06 RX ORDER — METOPROLOL TARTRATE 50 MG/1
TABLET ORAL
Qty: 60 TAB | Refills: 5 | Status: SHIPPED | OUTPATIENT
Start: 2019-03-06 | End: 2019-07-04 | Stop reason: SDUPTHER

## 2019-03-06 RX ORDER — SIMVASTATIN 20 MG/1
TABLET, FILM COATED ORAL
Qty: 30 TAB | Refills: 5 | Status: SHIPPED | OUTPATIENT
Start: 2019-03-06 | End: 2019-07-04 | Stop reason: SDUPTHER

## 2019-09-16 ENCOUNTER — OFFICE VISIT (OUTPATIENT)
Dept: INTERNAL MEDICINE CLINIC | Age: 54
End: 2019-09-16

## 2019-09-16 VITALS
HEIGHT: 68 IN | OXYGEN SATURATION: 97 % | HEART RATE: 58 BPM | RESPIRATION RATE: 16 BRPM | DIASTOLIC BLOOD PRESSURE: 66 MMHG | BODY MASS INDEX: 31.83 KG/M2 | WEIGHT: 210 LBS | TEMPERATURE: 97.5 F | SYSTOLIC BLOOD PRESSURE: 136 MMHG

## 2019-09-16 DIAGNOSIS — R73.09 ELEVATED GLUCOSE: ICD-10-CM

## 2019-09-16 DIAGNOSIS — Z79.899 ENCOUNTER FOR LONG-TERM (CURRENT) USE OF MEDICATIONS: ICD-10-CM

## 2019-09-16 DIAGNOSIS — I10 BENIGN HYPERTENSION: Primary | ICD-10-CM

## 2019-09-16 DIAGNOSIS — E78.2 MIXED HYPERLIPIDEMIA: ICD-10-CM

## 2019-09-16 DIAGNOSIS — Z12.5 PROSTATE CANCER SCREENING: ICD-10-CM

## 2019-09-16 NOTE — PROGRESS NOTES
Subjective:      Gennaro Lynch is a 62 y.o. male who presents today for follow up of his hypertension, hyperlipidemia and elevated glucose. Due for:  -all fasting labs and PSA. Patient Active Problem List   Diagnosis Code    HTN (hypertension) I10    S/P tonsillectomy Z90.89    Hyperlipidemia E78.5    Colon cancer screening Z12.11    Elevated glucose R73.09    Obesity (BMI 30-39. 9) E66.9     Current Outpatient Medications   Medication Sig Dispense Refill    metoprolol tartrate (LOPRESSOR) 50 mg tablet TAKE 1 TABLET BY MOUTH TWICE A DAY 60 Tab 2    simvastatin (ZOCOR) 20 mg tablet TAKE 1 TABLET BY MOUTH EVERY DAY 30 Tab 2    OTHER Juice plus 2 tablets 3 times daily      coenzyme q10 (CO Q-10) 10 mg cap Take 1 Tab by mouth daily.  MULTIVITAMIN PO Take  by mouth daily.  ASPIRIN PO Take 81 mg by mouth daily.  DOCOSAHEXANOIC ACID/EPA (FISH OIL PO) Take 4,000 mg by mouth daily. Review of Systems    Pertinent items are noted in HPI. Objective: Wt Readings from Last 3 Encounters:   09/16/19 210 lb (95.3 kg)   01/30/19 213 lb (96.6 kg)   07/30/18 213 lb (96.6 kg)        Visit Vitals  /66 (BP 1 Location: Left arm, BP Patient Position: Sitting)   Pulse (!) 58   Temp 97.5 °F (36.4 °C) (Oral)   Resp 16   Ht 5' 8.25\" (1.734 m)   Wt 210 lb (95.3 kg)   SpO2 97%   BMI 31.70 kg/m²     General appearance: alert, cooperative, no distress, appears stated age  Head: Normocephalic, without obvious abnormality, atraumatic  Ears: normal TM's and external ear canals AU  Throat: Lips, mucosa, and tongue normal. Teeth and gums normal  Neck: supple, symmetrical, trachea midline, no adenopathy, no carotid bruit and no JVD  Lungs: clear to auscultation bilaterally  Heart: regular rate and rhythm, S1, S2 normal, no murmur, click, rub or gallop  Abdomen: soft, non-tender.  Bowel sounds normal. No masses,  no organomegaly  Extremities: extremities normal, atraumatic, no cyanosis or edema  Pulses: 2+ and symmetric    Assessment/Plan:     1. Benign hypertension  -I evaluated and recommended to continue current doses of medications. - CBC WITH AUTOMATED DIFF  - METABOLIC PANEL, COMPREHENSIVE  - UA WITH REFLEX MICRO AND CULTURE    2. Mixed hyperlipidemia  -encouraged low fat diet. - METABOLIC PANEL, COMPREHENSIVE  - LIPID PANEL    3. Elevated glucose  -watch sugars and carbs in diet.     - HEMOGLOBIN A1C WITH EAG    4. Encounter for long-term (current) use of medications      5. Prostate cancer screening    - PSA SCREENING (SCREENING)     Orders Placed This Encounter    CBC WITH AUTOMATED DIFF    METABOLIC PANEL, COMPREHENSIVE    LIPID PANEL    PSA SCREENING (SCREENING)    UA WITH REFLEX MICRO AND CULTURE    HEMOGLOBIN A1C WITH EAG      Follow-up Disposition:     Follow up in 6 months     Return if symptoms worsen or fail to improve. Advised patient to call back or return to office if symptoms worsen/change/persist.     Discussed expected course/resolution/complications of diagnosis in detail with patient. Medication risks/benefits/costs/interactions/alternatives discussed with patient. Patient was given an after visit summary which includes diagnoses, current medications, & vitals. Patient expressed understanding with the diagnosis and plan.

## 2019-09-16 NOTE — PROGRESS NOTES
Verified name and birth date for privacy precautions. Chart reviewed in preparation for today's visit. Chief Complaint   Patient presents with    Hypertension     follow up, fasting     Cholesterol Problem          Health Maintenance Due   Topic    Influenza Age 5 to Adult          Wt Readings from Last 3 Encounters:   09/16/19 210 lb (95.3 kg)   01/30/19 213 lb (96.6 kg)   07/30/18 213 lb (96.6 kg)     Temp Readings from Last 3 Encounters:   09/16/19 97.5 °F (36.4 °C) (Oral)   01/30/19 97.7 °F (36.5 °C) (Oral)   07/30/18 97.8 °F (36.6 °C) (Oral)     BP Readings from Last 3 Encounters:   09/16/19 136/66   01/30/19 126/90   07/30/18 132/86     Pulse Readings from Last 3 Encounters:   09/16/19 (!) 58   01/30/19 71   07/30/18 60         Learning Assessment:  :     Learning Assessment 1/30/2019 7/30/2018 8/1/2014   PRIMARY LEARNER Patient Patient Patient   HIGHEST LEVEL OF EDUCATION - PRIMARY LEARNER  4 YEARS OF COLLEGE 4 YEARS OF COLLEGE 4 YEARS OF COLLEGE   BARRIERS PRIMARY LEARNER NONE NONE NONE   CO-LEARNER CAREGIVER - No No   PRIMARY LANGUAGE ENGLISH ENGLISH ENGLISH    NEED - - No   LEARNER PREFERENCE PRIMARY DEMONSTRATION READING OTHER (COMMENT)   LEARNING SPECIAL TOPICS - - no   ANSWERED BY patient self patinet   RELATIONSHIP SELF SELF SELF       Depression Screening:  :     3 most recent PHQ Screens 1/30/2019   Little interest or pleasure in doing things Not at all   Feeling down, depressed, irritable, or hopeless Not at all   Total Score PHQ 2 0       Fall Risk Assessment:  :     Fall Risk Assessment, last 12 mths 1/30/2019   Able to walk? Yes   Fall in past 12 months? No       Abuse Screening:  :     Abuse Screening Questionnaire 1/30/2019 7/30/2018 8/28/2017 12/16/2015 8/1/2014   Do you ever feel afraid of your partner? N N N N N   Are you in a relationship with someone who physically or mentally threatens you? N N N N N   Is it safe for you to go home?  Luciano Chaidez       Coordination of Care Questionnaire:  :     1) Have you been to an emergency room, urgent care clinic since your last visit? no   Hospitalized since your last visit? no             2) Have you seen or consulted any other health care providers outside of [address] [place] [address] since your last visit? no  (Include any pap smears or colon screenings in this section.)    3) Do you have an Advance Directive on file? no      Patient is currently accompanied by his self & I have received verbal consent from Savage Macedo to discuss any/all medical information while he/she is present in the room.     ------------------------------------------------

## 2019-09-17 LAB
ALBUMIN SERPL-MCNC: 4.3 G/DL (ref 3.5–5.5)
ALBUMIN/GLOB SERPL: 1.7 {RATIO} (ref 1.2–2.2)
ALP SERPL-CCNC: 68 IU/L (ref 39–117)
ALT SERPL-CCNC: 19 IU/L (ref 0–44)
APPEARANCE UR: CLEAR
AST SERPL-CCNC: 13 IU/L (ref 0–40)
BACTERIA #/AREA URNS HPF: ABNORMAL /[HPF]
BASOPHILS # BLD AUTO: 0 X10E3/UL (ref 0–0.2)
BASOPHILS NFR BLD AUTO: 1 %
BILIRUB SERPL-MCNC: 0.9 MG/DL (ref 0–1.2)
BILIRUB UR QL STRIP: NEGATIVE
BUN SERPL-MCNC: 14 MG/DL (ref 6–24)
BUN/CREAT SERPL: 13 (ref 9–20)
CALCIUM SERPL-MCNC: 9.4 MG/DL (ref 8.7–10.2)
CASTS URNS MICRO: ABNORMAL
CASTS URNS QL MICRO: PRESENT /LPF
CHLORIDE SERPL-SCNC: 101 MMOL/L (ref 96–106)
CHOLEST SERPL-MCNC: 159 MG/DL (ref 100–199)
CO2 SERPL-SCNC: 24 MMOL/L (ref 20–29)
COLOR UR: YELLOW
CREAT SERPL-MCNC: 1.04 MG/DL (ref 0.76–1.27)
EOSINOPHIL # BLD AUTO: 0.1 X10E3/UL (ref 0–0.4)
EOSINOPHIL NFR BLD AUTO: 2 %
EPI CELLS #/AREA URNS HPF: ABNORMAL /HPF (ref 0–10)
ERYTHROCYTE [DISTWIDTH] IN BLOOD BY AUTOMATED COUNT: 13.1 % (ref 12.3–15.4)
EST. AVERAGE GLUCOSE BLD GHB EST-MCNC: 111 MG/DL
GLOBULIN SER CALC-MCNC: 2.6 G/DL (ref 1.5–4.5)
GLUCOSE SERPL-MCNC: 97 MG/DL (ref 65–99)
GLUCOSE UR QL: NEGATIVE
HBA1C MFR BLD: 5.5 % (ref 4.8–5.6)
HCT VFR BLD AUTO: 47.3 % (ref 37.5–51)
HDLC SERPL-MCNC: 42 MG/DL
HGB BLD-MCNC: 16 G/DL (ref 13–17.7)
HGB UR QL STRIP: NEGATIVE
IMM GRANULOCYTES # BLD AUTO: 0 X10E3/UL (ref 0–0.1)
IMM GRANULOCYTES NFR BLD AUTO: 0 %
INTERPRETATION, 910389: NORMAL
KETONES UR QL STRIP: NEGATIVE
LDLC SERPL CALC-MCNC: 81 MG/DL (ref 0–99)
LEUKOCYTE ESTERASE UR QL STRIP: NEGATIVE
LYMPHOCYTES # BLD AUTO: 2.1 X10E3/UL (ref 0.7–3.1)
LYMPHOCYTES NFR BLD AUTO: 37 %
MCH RBC QN AUTO: 29.7 PG (ref 26.6–33)
MCHC RBC AUTO-ENTMCNC: 33.8 G/DL (ref 31.5–35.7)
MCV RBC AUTO: 88 FL (ref 79–97)
MICRO URNS: NORMAL
MICRO URNS: NORMAL
MONOCYTES # BLD AUTO: 0.4 X10E3/UL (ref 0.1–0.9)
MONOCYTES NFR BLD AUTO: 6 %
MUCOUS THREADS URNS QL MICRO: PRESENT
NEUTROPHILS # BLD AUTO: 3.1 X10E3/UL (ref 1.4–7)
NEUTROPHILS NFR BLD AUTO: 54 %
NITRITE UR QL STRIP: NEGATIVE
PH UR STRIP: 6 [PH] (ref 5–7.5)
PLATELET # BLD AUTO: 189 X10E3/UL (ref 150–450)
POTASSIUM SERPL-SCNC: 4.4 MMOL/L (ref 3.5–5.2)
PROT SERPL-MCNC: 6.9 G/DL (ref 6–8.5)
PROT UR QL STRIP: NEGATIVE
PSA SERPL-MCNC: 0.8 NG/ML (ref 0–4)
RBC # BLD AUTO: 5.38 X10E6/UL (ref 4.14–5.8)
RBC #/AREA URNS HPF: ABNORMAL /HPF (ref 0–2)
SODIUM SERPL-SCNC: 141 MMOL/L (ref 134–144)
SP GR UR: 1.02 (ref 1–1.03)
TRIGL SERPL-MCNC: 179 MG/DL (ref 0–149)
URINALYSIS REFLEX, 377202: NORMAL
UROBILINOGEN UR STRIP-MCNC: 0.2 MG/DL (ref 0.2–1)
VLDLC SERPL CALC-MCNC: 36 MG/DL (ref 5–40)
WBC # BLD AUTO: 5.8 X10E3/UL (ref 3.4–10.8)
WBC #/AREA URNS HPF: ABNORMAL /HPF (ref 0–5)

## 2020-08-05 ENCOUNTER — OFFICE VISIT (OUTPATIENT)
Dept: URGENT CARE | Age: 55
End: 2020-08-05
Payer: COMMERCIAL

## 2020-08-05 VITALS — OXYGEN SATURATION: 95 % | TEMPERATURE: 98.6 F | RESPIRATION RATE: 16 BRPM | HEART RATE: 74 BPM

## 2020-08-05 DIAGNOSIS — Z20.822 EXPOSURE TO COVID-19 VIRUS: Primary | ICD-10-CM

## 2020-08-05 DIAGNOSIS — R51.9 NONINTRACTABLE HEADACHE, UNSPECIFIED CHRONICITY PATTERN, UNSPECIFIED HEADACHE TYPE: ICD-10-CM

## 2020-08-05 DIAGNOSIS — R50.9 FEVER, UNSPECIFIED FEVER CAUSE: ICD-10-CM

## 2020-08-05 PROCEDURE — 99203 OFFICE O/P NEW LOW 30 MIN: CPT | Performed by: EMERGENCY MEDICINE

## 2020-08-05 NOTE — PROGRESS NOTES
Pt with low grade fever last night controlled with Tylenol and no fever today. Some frontal HA-mild. No known exposure. Denies other symptoms    The history is provided by the patient. Fever    The history is provided by the patient. This is a new problem. The current episode started yesterday. The problem has been gradually improving. The maximum temperature noted was 100 - 100.9 F. Associated symptoms include headaches (mild frontal HA). Pertinent negatives include no chest pain, no sleepiness, no diarrhea, no vomiting, no congestion, no sore throat, no muscle aches, no cough, no shortness of breath, no neck pain and no rash. He has tried acetaminophen for the symptoms. The treatment provided significant relief.         Past Medical History:   Diagnosis Date    HTN (hypertension)     Hyperlipidemia         Past Surgical History:   Procedure Laterality Date    HX TONSILLECTOMY  16's         Family History   Problem Relation Age of Onset    Elevated Lipids Mother     Heart Disease Mother         CAD s/p CABG    Hypertension Mother    [facility] Elevated Lipids Father     Heart Disease Father         CAD    Arrhythmia Father         Social History     Socioeconomic History    Marital status:      Spouse name: Not on file    Number of children: Not on file    Years of education: Not on file    Highest education level: Not on file   Occupational History    Not on file   Social Needs    Financial resource strain: Not on file    Food insecurity     Worry: Not on file     Inability: Not on file    Transportation needs     Medical: Not on file     Non-medical: Not on file   Tobacco Use    Smoking status: Never Smoker    Smokeless tobacco: Never Used   Substance and Sexual Activity    Alcohol use: Yes     Comment: occasional    Drug use: No    Sexual activity: Yes     Partners: Female   Lifestyle    Physical activity     Days per week: Not on file     Minutes per session: Not on file    Stress: Not on file   Relationships    Social connections     Talks on phone: Not on file     Gets together: Not on file     Attends Hinduism service: Not on file     Active member of club or organization: Not on file     Attends meetings of clubs or organizations: Not on file     Relationship status: Not on file    Intimate partner violence     Fear of current or ex partner: Not on file     Emotionally abused: Not on file     Physically abused: Not on file     Forced sexual activity: Not on file   Other Topics Concern    Not on file   Social History Narrative    Not on file                ALLERGIES: Patient has no known allergies. Review of Systems   Constitutional: Positive for fever. Negative for activity change, appetite change, chills, diaphoresis and fatigue. HENT: Negative for congestion, ear pain, postnasal drip, rhinorrhea, sinus pressure, sinus pain, sneezing and sore throat. Eyes: Negative for redness. Respiratory: Negative for cough, chest tightness, shortness of breath and wheezing. Cardiovascular: Negative for chest pain. Gastrointestinal: Negative for abdominal pain, diarrhea, nausea and vomiting. Musculoskeletal: Negative for arthralgias, myalgias, neck pain and neck stiffness. Skin: Negative for rash. Neurological: Positive for headaches (mild frontal HA). Vitals:    08/05/20 1237   Pulse: 74   Resp: 16   Temp: 98.6 °F (37 °C)   SpO2: 95%       Physical Exam  Vitals signs and nursing note reviewed. Constitutional:       General: He is not in acute distress. Appearance: Normal appearance. He is normal weight. He is not ill-appearing, toxic-appearing or diaphoretic. Comments: Pt examined in a vehicle     HENT:      Head: Normocephalic. Nose: No rhinorrhea. Mouth/Throat:      Mouth: Mucous membranes are moist.      Pharynx: Oropharynx is clear. No oropharyngeal exudate or posterior oropharyngeal erythema. Eyes:      General:         Right eye: No discharge. Left eye: No discharge. Extraocular Movements: Extraocular movements intact. Conjunctiva/sclera: Conjunctivae normal.      Pupils: Pupils are equal, round, and reactive to light. Neck:      Musculoskeletal: Normal range of motion and neck supple. No neck rigidity or muscular tenderness. Cardiovascular:      Rate and Rhythm: Normal rate and regular rhythm. Heart sounds: Normal heart sounds. Pulmonary:      Effort: Pulmonary effort is normal. No respiratory distress. Breath sounds: Normal breath sounds. No stridor. No wheezing, rhonchi or rales. Chest:      Chest wall: No tenderness. Abdominal:      General: Bowel sounds are normal.      Palpations: Abdomen is soft. There is no mass. Tenderness: There is no abdominal tenderness. There is no guarding. Lymphadenopathy:      Cervical: No cervical adenopathy. Skin:     Capillary Refill: Capillary refill takes less than 2 seconds. Findings: No rash. Comments: Pt examined in vehicle, limited exposure but what is seen is negative for rashes      Neurological:      Mental Status: He is alert and oriented to person, place, and time. Comments: Coordinated movement noted on exam with clear speech. No appreciable deficits appreciated during exam while pt is in vehicle     Psychiatric:         Mood and Affect: Mood normal.         MDM    ICD-10-CM ICD-9-CM    1. Exposure to COVID-19 virus  Z20.828 V01.79 NOVEL CORONAVIRUS (COVID-19)   2. Fever, unspecified fever cause  R50.9 780.60 NOVEL CORONAVIRUS (COVID-19)   3. Nonintractable headache, unspecified chronicity pattern, unspecified headache type  R51 784.0 NOVEL CORONAVIRUS (COVID-19)     No orders of the defined types were placed in this encounter. The patients condition was discussed with the patient and they understand. The patient is to follow up with primary care doctor ,If signs and symptoms become worse the pt is to go to the ER.  The patient is to take medications as prescribed. The condition and symptoms were discussed with the patient and the patient verbalized understanding of diagnosis and the plan. The patient is to follow up with the primary care physician for continued care and is to call 911 for severe symptoms or report to the Emergency department for new, persistent or worsening symptoms including but not limited to the ones discussed. Pt instructed to quarantine at least until test results are back.      Procedures

## 2020-08-07 LAB — SARS-COV-2, NAA: NOT DETECTED

## 2020-08-17 ENCOUNTER — OFFICE VISIT (OUTPATIENT)
Dept: INTERNAL MEDICINE CLINIC | Age: 55
End: 2020-08-17

## 2020-08-17 VITALS
OXYGEN SATURATION: 96 % | DIASTOLIC BLOOD PRESSURE: 84 MMHG | SYSTOLIC BLOOD PRESSURE: 133 MMHG | HEART RATE: 65 BPM | RESPIRATION RATE: 16 BRPM | TEMPERATURE: 98 F | HEIGHT: 69 IN | WEIGHT: 221 LBS | BODY MASS INDEX: 32.73 KG/M2

## 2020-08-17 DIAGNOSIS — I15.9 SECONDARY HYPERTENSION: ICD-10-CM

## 2020-08-17 DIAGNOSIS — E78.00 PURE HYPERCHOLESTEROLEMIA: ICD-10-CM

## 2020-08-17 DIAGNOSIS — Z00.00 ENCOUNTER FOR PHYSICAL EXAMINATION: Primary | ICD-10-CM

## 2020-08-17 DIAGNOSIS — H66.91 RIGHT OTITIS MEDIA, UNSPECIFIED OTITIS MEDIA TYPE: ICD-10-CM

## 2020-08-17 DIAGNOSIS — Z12.11 COLON CANCER SCREENING: Chronic | ICD-10-CM

## 2020-08-17 DIAGNOSIS — Z23 NEED FOR SHINGLES VACCINE: ICD-10-CM

## 2020-08-17 PROCEDURE — 93000 ELECTROCARDIOGRAM COMPLETE: CPT | Performed by: NURSE PRACTITIONER

## 2020-08-17 PROCEDURE — 99396 PREV VISIT EST AGE 40-64: CPT | Performed by: NURSE PRACTITIONER

## 2020-08-17 RX ORDER — AMOXICILLIN 500 MG/1
500 CAPSULE ORAL 3 TIMES DAILY
Qty: 15 CAP | Refills: 0 | Status: SHIPPED | OUTPATIENT
Start: 2020-08-17 | End: 2020-08-22

## 2020-08-17 NOTE — PROGRESS NOTES
61 yo male here for complete physical    ROS: HEENT: R ear discomfort for a few days; no fever or head cold; no dizziness   CP : neg   GI/abd : neg    : neg   MS/NP : neg   Sleep : 7-8 hrs and restful   Exercise: works with  3 days/wk   Diet : is good, weight up a little    Health Maintenance   Dental - twice/year   Eye care - Optometry for contact lenses   Colonoscopy 2017 - one benign sigmoid polyp and PanDiverticulosis - Patient told to repeat in 10 years     PMH, PSH, FH & SH: reviewed/updated    PE: WNWD WM   Weight - up 11 lbs from 2019   Repeat BP = 133/84   Ears - R TM is red; L canal scaly, TM dull   Nose - L membranes boggy   Phar - nl   Neck - no palp nodes; no carotid bruits   Heart - RRR   Lungs - clear   Abd - no M,T,O   Bilat DP pulses - 2+  EKG: abnormal (denies chest pain or decrease in tolerance to exertion; F  in his late 46s of heart disease)    Imp: Encounter for physical   R Ear - TM red and painful - Otitis Media   HTN   HLD   Obesity - BMI 32.58   Abn EKG   Father  in his late 46s of heart disease    Plan: Amoxicillin for 5 days   Fasting labs   FIT test   AMD - he will bring in copy   Shingrix - rx given   Suggested Ophthal exam - has never had one   Will ask Dr. Taurus Tyler to review EKG, and decide whether intervention is needed at this time   See Dr. Taurus Tyler 6 mos  ______________________________  Expected course of current diagnosed problem(s) as well as expected progression and possible complications, and desired follow up with provider are discussed with patient. Patient is encouraged to be back in touch with any questions or concerns. Patient expresses understanding of plan of care. Patient is given AVS which includes diagnoses, current medications, vitals.

## 2020-08-17 NOTE — PATIENT INSTRUCTIONS
Bring in a copy of your Advance Medical Directive for scanning into the computer See an Ophthalmologist (medical eye doctor) for an exam - they evaluate for more in depth things that the Optometrist who takes care of your contact lense needs

## 2020-08-17 NOTE — PROGRESS NOTES
Chief Complaint   Patient presents with    Complete Physical     1. Have you been to the ER, urgent care clinic since your last visit? Hospitalized since your last visit? Yes Where: [address] [place] [address] Reason for visit: COVID 19-neg    2. Have you seen or consulted any other health care providers outside of the [address] [place] [address] since your last visit? Include any pap smears or colon screening.  No

## 2020-08-18 DIAGNOSIS — R94.31 ABNORMAL EKG: Primary | ICD-10-CM

## 2020-08-18 DIAGNOSIS — Z82.49 FHX: PREMATURE CORONARY HEART DISEASE: ICD-10-CM

## 2020-08-18 DIAGNOSIS — I15.9 SECONDARY HYPERTENSION: ICD-10-CM

## 2020-08-18 LAB
ALBUMIN SERPL-MCNC: 4.5 G/DL (ref 3.8–4.9)
ALBUMIN/GLOB SERPL: 1.7 {RATIO} (ref 1.2–2.2)
ALP SERPL-CCNC: 74 IU/L (ref 39–117)
ALT SERPL-CCNC: 22 IU/L (ref 0–44)
APPEARANCE UR: CLEAR
AST SERPL-CCNC: 18 IU/L (ref 0–40)
BASOPHILS # BLD AUTO: 0 X10E3/UL (ref 0–0.2)
BASOPHILS NFR BLD AUTO: 1 %
BILIRUB SERPL-MCNC: 0.5 MG/DL (ref 0–1.2)
BILIRUB UR QL STRIP: NEGATIVE
BUN SERPL-MCNC: 11 MG/DL (ref 6–24)
BUN/CREAT SERPL: 10 (ref 9–20)
CALCIUM SERPL-MCNC: 9.5 MG/DL (ref 8.7–10.2)
CHLORIDE SERPL-SCNC: 98 MMOL/L (ref 96–106)
CHOLEST SERPL-MCNC: 166 MG/DL (ref 100–199)
CO2 SERPL-SCNC: 26 MMOL/L (ref 20–29)
COLOR UR: YELLOW
CREAT SERPL-MCNC: 1.05 MG/DL (ref 0.76–1.27)
EOSINOPHIL # BLD AUTO: 0.1 X10E3/UL (ref 0–0.4)
EOSINOPHIL NFR BLD AUTO: 2 %
ERYTHROCYTE [DISTWIDTH] IN BLOOD BY AUTOMATED COUNT: 13.2 % (ref 11.6–15.4)
GLOBULIN SER CALC-MCNC: 2.7 G/DL (ref 1.5–4.5)
GLUCOSE SERPL-MCNC: 85 MG/DL (ref 65–99)
GLUCOSE UR QL: NEGATIVE
HCT VFR BLD AUTO: 50.5 % (ref 37.5–51)
HDLC SERPL-MCNC: 43 MG/DL
HGB BLD-MCNC: 16.6 G/DL (ref 13–17.7)
HGB UR QL STRIP: NEGATIVE
IMM GRANULOCYTES # BLD AUTO: 0 X10E3/UL (ref 0–0.1)
IMM GRANULOCYTES NFR BLD AUTO: 1 %
KETONES UR QL STRIP: NEGATIVE
LDLC SERPL CALC-MCNC: 82 MG/DL (ref 0–99)
LEUKOCYTE ESTERASE UR QL STRIP: NEGATIVE
LYMPHOCYTES # BLD AUTO: 2.6 X10E3/UL (ref 0.7–3.1)
LYMPHOCYTES NFR BLD AUTO: 33 %
MCH RBC QN AUTO: 29.3 PG (ref 26.6–33)
MCHC RBC AUTO-ENTMCNC: 32.9 G/DL (ref 31.5–35.7)
MCV RBC AUTO: 89 FL (ref 79–97)
MICRO URNS: NORMAL
MONOCYTES # BLD AUTO: 0.6 X10E3/UL (ref 0.1–0.9)
MONOCYTES NFR BLD AUTO: 7 %
NEUTROPHILS # BLD AUTO: 4.5 X10E3/UL (ref 1.4–7)
NEUTROPHILS NFR BLD AUTO: 56 %
NITRITE UR QL STRIP: NEGATIVE
PH UR STRIP: 5 [PH] (ref 5–7.5)
PLATELET # BLD AUTO: 232 X10E3/UL (ref 150–450)
POTASSIUM SERPL-SCNC: 4.4 MMOL/L (ref 3.5–5.2)
PROT SERPL-MCNC: 7.2 G/DL (ref 6–8.5)
PROT UR QL STRIP: NEGATIVE
PSA SERPL-MCNC: 1 NG/ML (ref 0–4)
RBC # BLD AUTO: 5.67 X10E6/UL (ref 4.14–5.8)
SODIUM SERPL-SCNC: 138 MMOL/L (ref 134–144)
SP GR UR: 1.02 (ref 1–1.03)
TRIGL SERPL-MCNC: 206 MG/DL (ref 0–149)
UROBILINOGEN UR STRIP-MCNC: 0.2 MG/DL (ref 0.2–1)
VLDLC SERPL CALC-MCNC: 41 MG/DL (ref 5–40)
WBC # BLD AUTO: 7.8 X10E3/UL (ref 3.4–10.8)

## 2020-08-24 ENCOUNTER — OFFICE VISIT (OUTPATIENT)
Dept: CARDIOLOGY CLINIC | Age: 55
End: 2020-08-24
Payer: COMMERCIAL

## 2020-08-24 VITALS
DIASTOLIC BLOOD PRESSURE: 82 MMHG | RESPIRATION RATE: 15 BRPM | HEIGHT: 69 IN | BODY MASS INDEX: 32.44 KG/M2 | SYSTOLIC BLOOD PRESSURE: 130 MMHG | WEIGHT: 219 LBS | HEART RATE: 64 BPM | OXYGEN SATURATION: 98 %

## 2020-08-24 DIAGNOSIS — E78.2 MIXED HYPERLIPIDEMIA: ICD-10-CM

## 2020-08-24 DIAGNOSIS — I10 ESSENTIAL HYPERTENSION: ICD-10-CM

## 2020-08-24 DIAGNOSIS — R94.31 ABNORMAL EKG: Primary | ICD-10-CM

## 2020-08-24 PROCEDURE — 99244 OFF/OP CNSLTJ NEW/EST MOD 40: CPT | Performed by: SPECIALIST

## 2020-08-24 NOTE — PROGRESS NOTES
HISTORY OF PRESENT ILLNESS  Martin Rodriguez is a 60 y.o. male. He is referred for evaluation of an abnormal EKG. This was read as showing prior myocardial infarction by the computer. He has never had severe chest pain. He works as a  and also exercises 3 times a week with a . He does not smoke cigarettes or have diabetes. He does take metoprolol for mild hypertension. He has no family history of premature coronary disease. He drinks alcohol on occasion. HPI  Patient Active Problem List   Diagnosis Code    HTN (hypertension) I10    S/P tonsillectomy Z90.89    Hyperlipidemia E78.5    Colon cancer screening Z12.11    Elevated glucose R73.09    Obesity (BMI 30-39. 9) E66.9     Current Outpatient Medications   Medication Sig Dispense Refill    metoprolol tartrate (LOPRESSOR) 50 mg tablet TAKE 1 TABLET BY MOUTH TWICE A  Tab 1    simvastatin (ZOCOR) 20 mg tablet TAKE 1 TABLET BY MOUTH EVERY DAY 90 Tab 1    OTHER Juice plus 2 tablets 3 times daily      coenzyme q10 (CO Q-10) 10 mg cap Take 1 Tab by mouth daily.  MULTIVITAMIN PO Take  by mouth daily.  ASPIRIN PO Take 81 mg by mouth daily.  DOCOSAHEXANOIC ACID/EPA (FISH OIL PO) Take 4,000 mg by mouth daily. Past Medical History:   Diagnosis Date    HTN (hypertension)     Hyperlipidemia      Past Surgical History:   Procedure Laterality Date    HX TONSILLECTOMY  1970's       Review of Systems   Constitutional: Negative. HENT: Negative. Eyes: Negative. Respiratory: Negative. Cardiovascular: Negative. Gastrointestinal: Negative. Musculoskeletal: Negative. Skin: Negative. Neurological: Negative. Endo/Heme/Allergies: Negative. Psychiatric/Behavioral: Negative.       Visit Vitals  /82 (BP 1 Location: Left arm, BP Patient Position: Sitting)   Pulse 64   Resp 15   Ht 5' 9\" (1.753 m)   Wt 219 lb (99.3 kg)   SpO2 98%   BMI 32.34 kg/m²       Physical Exam   Constitutional: He is oriented to person, place, and time. He appears well-nourished. HENT:   Head: Atraumatic. Eyes: Conjunctivae are normal.   Neck: Neck supple. Cardiovascular: Normal rate, regular rhythm and normal heart sounds. Exam reveals no gallop and no friction rub. No murmur heard. Pulmonary/Chest: Breath sounds normal. He has no wheezes. He has no rales. Abdominal: Bowel sounds are normal.   Musculoskeletal:         General: No edema. Neurological: He is oriented to person, place, and time. Skin: Skin is dry. Psychiatric: His behavior is normal.   Nursing note and vitals reviewed. ASSESSMENT and PLAN  I suspect that his EKG is a false positive based on his lack of symptoms and his ability to exercise. I will have him return for an echocardiogram and call him regarding the results.

## 2020-08-31 ENCOUNTER — ANCILLARY PROCEDURE (OUTPATIENT)
Dept: CARDIOLOGY CLINIC | Age: 55
End: 2020-08-31
Payer: COMMERCIAL

## 2020-08-31 ENCOUNTER — TELEPHONE (OUTPATIENT)
Dept: CARDIOLOGY CLINIC | Age: 55
End: 2020-08-31

## 2020-08-31 VITALS — HEIGHT: 69 IN | BODY MASS INDEX: 32.44 KG/M2 | WEIGHT: 219 LBS

## 2020-08-31 DIAGNOSIS — R94.31 ABNORMAL EKG: ICD-10-CM

## 2020-08-31 LAB
ECHO AO ASC DIAM: 2.93 CM
ECHO AO ROOT DIAM: 3.71 CM
ECHO AV AREA PEAK VELOCITY: 2.62 CM2
ECHO AV AREA VTI: 3 CM2
ECHO AV AREA/BSA PEAK VELOCITY: 1.2 CM2/M2
ECHO AV AREA/BSA VTI: 1.4 CM2/M2
ECHO AV MEAN GRADIENT: 2.43 MMHG
ECHO AV PEAK GRADIENT: 4.34 MMHG
ECHO AV PEAK VELOCITY: 104.17 CM/S
ECHO AV VTI: 19.54 CM
ECHO LA AREA 4C: 19.77 CM2
ECHO LA MAJOR AXIS: 4.07 CM
ECHO LA MINOR AXIS: 1.9 CM
ECHO LA VOL 2C: 47 ML (ref 18–58)
ECHO LA VOL 4C: 50.12 ML (ref 18–58)
ECHO LA VOL BP: 54.05 ML (ref 18–58)
ECHO LA VOL/BSA BIPLANE: 25.18 ML/M2 (ref 16–28)
ECHO LA VOLUME INDEX A2C: 21.89 ML/M2 (ref 16–28)
ECHO LA VOLUME INDEX A4C: 23.35 ML/M2 (ref 16–28)
ECHO LV E' LATERAL VELOCITY: 8.82 CM/S
ECHO LV E' SEPTAL VELOCITY: 8.19 CM/S
ECHO LV EDV A2C: 72.01 ML
ECHO LV EDV A4C: 82.6 ML
ECHO LV EDV BP: 81.65 ML (ref 67–155)
ECHO LV EDV INDEX A4C: 38.5 ML/M2
ECHO LV EDV INDEX BP: 38 ML/M2
ECHO LV EDV NDEX A2C: 33.5 ML/M2
ECHO LV EJECTION FRACTION A2C: 76 PERCENT
ECHO LV EJECTION FRACTION A4C: 63 PERCENT
ECHO LV EJECTION FRACTION BIPLANE: 69.1 PERCENT (ref 55–100)
ECHO LV ESV A2C: 17.24 ML
ECHO LV ESV A4C: 30.72 ML
ECHO LV ESV BP: 25.2 ML (ref 22–58)
ECHO LV ESV INDEX A2C: 8 ML/M2
ECHO LV ESV INDEX A4C: 14.3 ML/M2
ECHO LV ESV INDEX BP: 11.7 ML/M2
ECHO LV INTERNAL DIMENSION DIASTOLIC: 5.29 CM (ref 4.2–5.9)
ECHO LV INTERNAL DIMENSION SYSTOLIC: 3.02 CM
ECHO LV IVSD: 1.11 CM (ref 0.6–1)
ECHO LV MASS 2D: 229.8 G (ref 88–224)
ECHO LV MASS INDEX 2D: 107.1 G/M2 (ref 49–115)
ECHO LV POSTERIOR WALL DIASTOLIC: 1.11 CM (ref 0.6–1)
ECHO LVOT DIAM: 1.96 CM
ECHO LVOT PEAK GRADIENT: 3.31 MMHG
ECHO LVOT PEAK VELOCITY: 90.96 CM/S
ECHO LVOT SV: 58.6 ML
ECHO LVOT VTI: 19.49 CM
ECHO MV A VELOCITY: 79.65 CM/S
ECHO MV E DECELERATION TIME (DT): 0.22 S
ECHO MV E VELOCITY: 63.51 CM/S
ECHO MV E/A RATIO: 0.8
ECHO MV E/E' LATERAL: 7.2
ECHO MV E/E' RATIO (AVERAGED): 7.48
ECHO MV E/E' SEPTAL: 7.75
ECHO PV MAX VELOCITY: 92.6 CM/S
ECHO PV PEAK INSTANTANEOUS GRADIENT SYSTOLIC: 3.43 MMHG
ECHO RV TAPSE: 2.64 CM (ref 1.5–2)
LA VOL DISK BP: 50.48 ML (ref 18–58)

## 2020-08-31 PROCEDURE — 93306 TTE W/DOPPLER COMPLETE: CPT | Performed by: SPECIALIST

## 2020-08-31 NOTE — TELEPHONE ENCOUNTER
Called patient. Verified patient's identity with two identifiers. Notified patient of results and Dr. Garcia Nab message. Patient verbalized understanding and denied further questions or concerns.

## 2020-08-31 NOTE — TELEPHONE ENCOUNTER
----- Message from Chicho Garcia MD sent at 8/31/2020  3:45 PM EDT -----  Echo normal, no prior MI, pursue normal activity, see again prn

## 2021-04-07 RX ORDER — SIMVASTATIN 20 MG/1
TABLET, FILM COATED ORAL
Qty: 90 TAB | Refills: 0 | Status: SHIPPED | OUTPATIENT
Start: 2021-04-07 | End: 2021-07-06

## 2021-04-07 NOTE — TELEPHONE ENCOUNTER
Last OV 8/17/2020  Future Appointments   Date Time Provider Anny Bucio   4/22/2021  9:00 AM Hardeep Griffin MD [facility]

## 2021-04-08 RX ORDER — METOPROLOL TARTRATE 50 MG/1
TABLET ORAL
Qty: 180 TAB | Refills: 1 | Status: SHIPPED | OUTPATIENT
Start: 2021-04-08 | End: 2021-04-13 | Stop reason: SDUPTHER

## 2021-04-08 RX ORDER — SIMVASTATIN 20 MG/1
TABLET, FILM COATED ORAL
Qty: 90 TAB | Refills: 1 | OUTPATIENT
Start: 2021-04-08

## 2021-04-13 RX ORDER — METOPROLOL TARTRATE 50 MG/1
TABLET ORAL
Qty: 180 TAB | Refills: 0 | Status: SHIPPED | OUTPATIENT
Start: 2021-04-13 | End: 2021-10-07

## 2021-05-28 ENCOUNTER — OFFICE VISIT (OUTPATIENT)
Dept: INTERNAL MEDICINE CLINIC | Age: 56
End: 2021-05-28
Payer: COMMERCIAL

## 2021-05-28 VITALS
TEMPERATURE: 98 F | DIASTOLIC BLOOD PRESSURE: 80 MMHG | SYSTOLIC BLOOD PRESSURE: 130 MMHG | OXYGEN SATURATION: 98 % | HEART RATE: 56 BPM | BODY MASS INDEX: 32.32 KG/M2 | RESPIRATION RATE: 16 BRPM | WEIGHT: 218.2 LBS | HEIGHT: 69 IN

## 2021-05-28 DIAGNOSIS — I10 BENIGN HYPERTENSION: Primary | ICD-10-CM

## 2021-05-28 DIAGNOSIS — E78.2 MIXED HYPERLIPIDEMIA: ICD-10-CM

## 2021-05-28 DIAGNOSIS — Z79.899 ENCOUNTER FOR LONG-TERM (CURRENT) USE OF MEDICATIONS: ICD-10-CM

## 2021-05-28 DIAGNOSIS — R73.09 ELEVATED GLUCOSE: ICD-10-CM

## 2021-05-28 PROCEDURE — 99214 OFFICE O/P EST MOD 30 MIN: CPT | Performed by: INTERNAL MEDICINE

## 2021-05-28 NOTE — PROGRESS NOTES
Assessment/Plan:     1. Benign hypertension  -I evaluated and recommended to continue current doses of medications. - CBC WITH AUTOMATED DIFF  - METABOLIC PANEL, COMPREHENSIVE  - URINALYSIS W/ REFLEX CULTURE    2. Mixed hyperlipidemia  -compliant with simvastatin 20mg daily. -need fasting lipid panel today. - CBC WITH AUTOMATED DIFF  - METABOLIC PANEL, COMPREHENSIVE  - LIPID PANEL    3. Elevated glucose  -continue to work on lowering sugar and carbohydrate intake. - METABOLIC PANEL, COMPREHENSIVE  - HEMOGLOBIN A1C WITH EAG    4. Encounter for long-term (current) use of medications      Orders Placed This Encounter    CBC WITH AUTOMATED DIFF    METABOLIC PANEL, COMPREHENSIVE    LIPID PANEL    URINALYSIS W/ REFLEX CULTURE    HEMOGLOBIN A1C WITH EAG             Follow-up Disposition:           Subjective:      Danay Donnelly is a 60 y.o. male who presents today for follow up of his hypertension, hyperlipidemia and elevated glucose. Since last visit:  -no significant weight changes. -no new concerns.  -denies any chest pain, shortness of breath, syncope, headaches, nausea/vomiting, or any bowel changes. Current Outpatient Medications   Medication Sig Dispense Refill    metoprolol tartrate (LOPRESSOR) 50 mg tablet TAKE 1 TABLET BY MOUTH TWICE A DAY. Need office visit for further refills 180 Tab 0    simvastatin (ZOCOR) 20 mg tablet TAKE 1 TABLET BY MOUTH EVERY DAY 90 Tab 0    OTHER Juice plus 2 tablets 3 times daily      coenzyme q10 (CO Q-10) 10 mg cap Take 1 Tab by mouth daily.  MULTIVITAMIN PO Take  by mouth daily.  ASPIRIN PO Take 81 mg by mouth daily.  DOCOSAHEXANOIC ACID/EPA (FISH OIL PO) Take 4,000 mg by mouth daily. Review of Systems    Pertinent items are noted in HPI. Objective:      Wt Readings from Last 3 Encounters:   05/28/21 218 lb 3.2 oz (99 kg)   08/31/20 219 lb (99.3 kg)   08/24/20 219 lb (99.3 kg)     BP Readings from Last 3 Encounters: 05/28/21 137/86   08/24/20 130/82   08/17/20 133/84     Visit Vitals  /80   Pulse (!) 56   Temp 98 °F (36.7 °C) (Temporal)   Resp 16   Ht 5' 9\" (1.753 m)   Wt 218 lb 3.2 oz (99 kg)   SpO2 98%   BMI 32.22 kg/m²     General appearance: alert, cooperative, no distress, appears stated age  Head: Normocephalic, without obvious abnormality, atraumatic  Neck: supple, symmetrical, trachea midline, no adenopathy, no carotid bruit and no JVD  Lungs: clear to auscultation bilaterally  Heart: regular rate and rhythm, S1, S2 normal, no murmur, click, rub or gallop  Abdomen: soft, non-tender. Bowel sounds normal. No masses,  no organomegaly  Extremities: extremities normal, atraumatic, no cyanosis or edema  Pulses: 2+ and symmetric              Disclaimer:  Return if symptoms worsen or fail to improve. Advised patient to call back or return to office if symptoms worsen/change/persist.     Discussed expected course/resolution/complications of diagnosis in detail with patient. Medication risks/benefits/costs/interactions/alternatives discussed with patient. Patient was given an after visit summary which includes diagnoses, current medications, & vitals. Patient expressed understanding with the diagnosis and plan.

## 2021-05-29 LAB
ALBUMIN SERPL-MCNC: 4.2 G/DL (ref 3.8–4.9)
ALBUMIN/GLOB SERPL: 1.6 {RATIO} (ref 1.2–2.2)
ALP SERPL-CCNC: 77 IU/L (ref 48–121)
ALT SERPL-CCNC: 20 IU/L (ref 0–44)
APPEARANCE UR: CLEAR
AST SERPL-CCNC: 22 IU/L (ref 0–40)
BACTERIA #/AREA URNS HPF: NORMAL /[HPF]
BASOPHILS # BLD AUTO: 0 X10E3/UL (ref 0–0.2)
BASOPHILS NFR BLD AUTO: 1 %
BILIRUB SERPL-MCNC: 0.7 MG/DL (ref 0–1.2)
BILIRUB UR QL STRIP: NEGATIVE
BUN SERPL-MCNC: 14 MG/DL (ref 6–24)
BUN/CREAT SERPL: 11 (ref 9–20)
CALCIUM SERPL-MCNC: 9.6 MG/DL (ref 8.7–10.2)
CASTS URNS QL MICRO: NORMAL /LPF
CHLORIDE SERPL-SCNC: 103 MMOL/L (ref 96–106)
CHOLEST SERPL-MCNC: 144 MG/DL (ref 100–199)
CO2 SERPL-SCNC: 26 MMOL/L (ref 20–29)
COLOR UR: YELLOW
CREAT SERPL-MCNC: 1.23 MG/DL (ref 0.76–1.27)
EOSINOPHIL # BLD AUTO: 0.2 X10E3/UL (ref 0–0.4)
EOSINOPHIL NFR BLD AUTO: 3 %
EPI CELLS #/AREA URNS HPF: NORMAL /HPF (ref 0–10)
ERYTHROCYTE [DISTWIDTH] IN BLOOD BY AUTOMATED COUNT: 13.1 % (ref 11.6–15.4)
EST. AVERAGE GLUCOSE BLD GHB EST-MCNC: 111 MG/DL
GLOBULIN SER CALC-MCNC: 2.6 G/DL (ref 1.5–4.5)
GLUCOSE SERPL-MCNC: 102 MG/DL (ref 65–99)
GLUCOSE UR QL: NEGATIVE
HBA1C MFR BLD: 5.5 % (ref 4.8–5.6)
HCT VFR BLD AUTO: 47.6 % (ref 37.5–51)
HDLC SERPL-MCNC: 43 MG/DL
HGB BLD-MCNC: 16.1 G/DL (ref 13–17.7)
HGB UR QL STRIP: NEGATIVE
IMM GRANULOCYTES # BLD AUTO: 0 X10E3/UL (ref 0–0.1)
IMM GRANULOCYTES NFR BLD AUTO: 0 %
KETONES UR QL STRIP: NEGATIVE
LDLC SERPL CALC-MCNC: 75 MG/DL (ref 0–99)
LEUKOCYTE ESTERASE UR QL STRIP: NEGATIVE
LYMPHOCYTES # BLD AUTO: 2.3 X10E3/UL (ref 0.7–3.1)
LYMPHOCYTES NFR BLD AUTO: 37 %
MCH RBC QN AUTO: 29.8 PG (ref 26.6–33)
MCHC RBC AUTO-ENTMCNC: 33.8 G/DL (ref 31.5–35.7)
MCV RBC AUTO: 88 FL (ref 79–97)
MICRO URNS: NORMAL
MICRO URNS: NORMAL
MONOCYTES # BLD AUTO: 0.5 X10E3/UL (ref 0.1–0.9)
MONOCYTES NFR BLD AUTO: 7 %
NEUTROPHILS # BLD AUTO: 3.2 X10E3/UL (ref 1.4–7)
NEUTROPHILS NFR BLD AUTO: 52 %
NITRITE UR QL STRIP: NEGATIVE
PH UR STRIP: 6.5 [PH] (ref 5–7.5)
PLATELET # BLD AUTO: 202 X10E3/UL (ref 150–450)
POTASSIUM SERPL-SCNC: 5.1 MMOL/L (ref 3.5–5.2)
PROT SERPL-MCNC: 6.8 G/DL (ref 6–8.5)
PROT UR QL STRIP: NEGATIVE
RBC # BLD AUTO: 5.41 X10E6/UL (ref 4.14–5.8)
RBC #/AREA URNS HPF: NORMAL /HPF (ref 0–2)
SODIUM SERPL-SCNC: 141 MMOL/L (ref 134–144)
SP GR UR: 1.02 (ref 1–1.03)
TRIGL SERPL-MCNC: 147 MG/DL (ref 0–149)
URINALYSIS REFLEX, 377202: NORMAL
UROBILINOGEN UR STRIP-MCNC: 0.2 MG/DL (ref 0.2–1)
VLDLC SERPL CALC-MCNC: 26 MG/DL (ref 5–40)
WBC # BLD AUTO: 6.1 X10E3/UL (ref 3.4–10.8)
WBC #/AREA URNS HPF: NORMAL /HPF (ref 0–5)

## 2021-07-06 RX ORDER — SIMVASTATIN 20 MG/1
TABLET, FILM COATED ORAL
Qty: 90 TABLET | Refills: 0 | Status: SHIPPED | OUTPATIENT
Start: 2021-07-06 | End: 2021-10-08

## 2021-10-08 RX ORDER — SIMVASTATIN 20 MG/1
TABLET, FILM COATED ORAL
Qty: 90 TABLET | Refills: 0 | Status: SHIPPED | OUTPATIENT
Start: 2021-10-08 | End: 2022-01-06

## 2022-01-06 RX ORDER — SIMVASTATIN 20 MG/1
TABLET, FILM COATED ORAL
Qty: 90 TABLET | Refills: 0 | Status: SHIPPED | OUTPATIENT
Start: 2022-01-06 | End: 2022-04-08

## 2022-01-06 RX ORDER — METOPROLOL TARTRATE 50 MG/1
TABLET ORAL
Qty: 180 TABLET | Refills: 0 | Status: SHIPPED | OUTPATIENT
Start: 2022-01-06 | End: 2022-04-08

## 2022-03-18 PROBLEM — E66.9 OBESITY (BMI 30-39.9): Status: ACTIVE | Noted: 2017-02-28

## 2022-03-18 PROBLEM — R73.09 ELEVATED GLUCOSE: Status: ACTIVE | Noted: 2017-02-28

## 2022-04-08 RX ORDER — SIMVASTATIN 20 MG/1
TABLET, FILM COATED ORAL
Qty: 90 TABLET | Refills: 0 | Status: SHIPPED | OUTPATIENT
Start: 2022-04-08 | End: 2022-07-08

## 2022-04-08 RX ORDER — METOPROLOL TARTRATE 50 MG/1
TABLET ORAL
Qty: 180 TABLET | Refills: 0 | Status: SHIPPED | OUTPATIENT
Start: 2022-04-08 | End: 2022-07-08

## 2022-05-10 ENCOUNTER — OFFICE VISIT (OUTPATIENT)
Dept: INTERNAL MEDICINE CLINIC | Age: 57
End: 2022-05-10
Payer: COMMERCIAL

## 2022-05-10 VITALS
SYSTOLIC BLOOD PRESSURE: 135 MMHG | OXYGEN SATURATION: 98 % | HEART RATE: 56 BPM | HEIGHT: 69 IN | BODY MASS INDEX: 32.14 KG/M2 | DIASTOLIC BLOOD PRESSURE: 82 MMHG | WEIGHT: 217 LBS | TEMPERATURE: 97.5 F | RESPIRATION RATE: 16 BRPM

## 2022-05-10 DIAGNOSIS — Z79.899 ENCOUNTER FOR LONG-TERM (CURRENT) USE OF MEDICATIONS: ICD-10-CM

## 2022-05-10 DIAGNOSIS — M10.9 GOUTY ARTHRITIS: ICD-10-CM

## 2022-05-10 DIAGNOSIS — E78.2 MIXED HYPERLIPIDEMIA: ICD-10-CM

## 2022-05-10 DIAGNOSIS — I10 BENIGN HYPERTENSION: Primary | ICD-10-CM

## 2022-05-10 DIAGNOSIS — R73.01 IFG (IMPAIRED FASTING GLUCOSE): ICD-10-CM

## 2022-05-10 PROCEDURE — 99214 OFFICE O/P EST MOD 30 MIN: CPT | Performed by: INTERNAL MEDICINE

## 2022-05-10 RX ORDER — ALLOPURINOL 100 MG/1
100 TABLET ORAL DAILY
COMMUNITY
End: 2022-10-04 | Stop reason: SDUPTHER

## 2022-05-10 NOTE — PROGRESS NOTES
Verified name and birth date for privacy precautions. Chart reviewed in preparation for today's visit. Chief Complaint   Patient presents with    Hypertension          Health Maintenance Due   Topic    Shingrix Vaccine Age 50> (1 of 2)    COVID-19 Vaccine (3 - Booster for Moderna series)         Wt Readings from Last 3 Encounters:   05/10/22 217 lb (98.4 kg)   05/28/21 218 lb 3.2 oz (99 kg)   08/31/20 219 lb (99.3 kg)     Temp Readings from Last 3 Encounters:   05/10/22 97.5 °F (36.4 °C) (Temporal)   05/28/21 98 °F (36.7 °C) (Temporal)   08/17/20 98 °F (36.7 °C) (Temporal)     BP Readings from Last 3 Encounters:   05/10/22 (!) 161/90   05/28/21 130/80   08/24/20 130/82     Pulse Readings from Last 3 Encounters:   05/10/22 (!) 56   05/28/21 (!) 56   08/24/20 64         Learning Assessment:  :     Learning Assessment 1/30/2019 7/30/2018 8/1/2014   PRIMARY LEARNER Patient Patient Patient   HIGHEST LEVEL OF EDUCATION - PRIMARY LEARNER  4 YEARS OF COLLEGE 4 YEARS OF COLLEGE 4 YEARS OF COLLEGE   BARRIERS PRIMARY LEARNER NONE NONE NONE   CO-LEARNER CAREGIVER - No No   PRIMARY LANGUAGE ENGLISH ENGLISH ENGLISH    NEED - - No   LEARNER PREFERENCE PRIMARY DEMONSTRATION READING OTHER (COMMENT)   LEARNING SPECIAL TOPICS - - no   ANSWERED BY patient self patinet   RELATIONSHIP SELF SELF SELF       Depression Screening:  :     3 most recent PHQ Screens 5/10/2022   Little interest or pleasure in doing things Not at all   Feeling down, depressed, irritable, or hopeless Not at all   Total Score PHQ 2 0       Fall Risk Assessment:  :     Fall Risk Assessment, last 12 mths 1/30/2019   Able to walk? Yes   Fall in past 12 months? No       Abuse Screening:  :     Abuse Screening Questionnaire 5/10/2022 1/30/2019 7/30/2018 8/28/2017 12/16/2015 8/1/2014   Do you ever feel afraid of your partner? N N N N N N   Are you in a relationship with someone who physically or mentally threatens you?  N N N N N N   Is it safe for you to go home?  Joyce Tuttle

## 2022-05-10 NOTE — PROGRESS NOTES
Assessment/Plan:     1. Benign hypertension  -I evaluated and recommended to continue current doses of medications. - CBC WITH AUTOMATED DIFF  - METABOLIC PANEL, COMPREHENSIVE    2. Mixed hyperlipidemia  -maintain low fat diet  -taking simvastain 20mg daily.     - CBC WITH AUTOMATED DIFF  - METABOLIC PANEL, COMPREHENSIVE  - LIPID PANEL    3. IFG (impaired fasting glucose)  -continue low sugar and carb diet. - METABOLIC PANEL, COMPREHENSIVE  - HEMOGLOBIN A1C WITH EAG    4. Encounter for long-term (current) use of medications      5. Gouty arthritis  -started on allopurinol 100mg daily with podiatrist in 2/2022. Pain in feet improved. -check uric acid levels. -will refill and adjust allopurinol as needed. - URIC ACID     Orders Placed This Encounter    CBC WITH AUTOMATED DIFF    METABOLIC PANEL, COMPREHENSIVE    LIPID PANEL    HEMOGLOBIN A1C WITH EAG    URIC ACID    allopurinoL (ZYLOPRIM) 100 mg tablet     Sig: Take 100 mg by mouth daily. Follow-up Disposition:     Follow up in 6 months               Subjective:      Eusebio Mcgraw is a 59 y.o. male who presents today for follow up of his hypertension, hyperlipidemia, IFG and gouty arthritis. Since last visit :  -started on allopurinol 100mg daily. Started in 2/2022. 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 Norris Covington. Since starting medication, he has been feeling quite well. Would like for me to take over prescription.     -denies any chest pain, shortness of breath, syncope, headaches, nausea/vomiting, or any bowel changes. Patient Care Team:  -Dr. Jenise Gallagher  -Dr. Taylor Reason - podiatrist.       Objective:      Wt Readings from Last 3 Encounters:   05/10/22 217 lb (98.4 kg)   05/28/21 218 lb 3.2 oz (99 kg)   08/31/20 219 lb (99.3 kg)     BP Readings from Last 3 Encounters:   05/10/22 (!) 161/90   05/28/21 130/80   08/24/20 130/82     Visit Vitals  /82   Pulse (!) 56   Temp 97.5 °F (36.4 °C) (Temporal)   Resp 16   Ht 5' 9\" (1.753 m)   Wt 217 lb (98.4 kg)   SpO2 98%   BMI 32.05 kg/m²     General appearance: alert, cooperative, no distress, appears stated age  Head: Normocephalic, without obvious abnormality, atraumatic  Eyes: negative  Neck: supple, symmetrical, trachea midline, no adenopathy, no carotid bruit and no JVD  Lungs: clear to auscultation bilaterally  Heart: regular rate and rhythm, S1, S2 normal, no murmur, click, rub or gallop  Abdomen: soft, non-tender. Bowel sounds normal. No masses,  no organomegaly  Extremities: no edema  Pulses: 2+ and symmetric              Disclaimer:  Return if symptoms worsen or fail to improve. Advised patient to call back or return to office if symptoms worsen/change/persist.     Discussed expected course/resolution/complications of diagnosis in detail with patient. Medication risks/benefits/costs/interactions/alternatives discussed with patient. Patient was given an after visit summary which includes diagnoses, current medications, & vitals. Patient expressed understanding with the diagnosis and plan.

## 2022-05-11 LAB
ALBUMIN SERPL-MCNC: 4.3 G/DL (ref 3.8–4.9)
ALBUMIN/GLOB SERPL: 1.5 {RATIO} (ref 1.2–2.2)
ALP SERPL-CCNC: 80 IU/L (ref 44–121)
ALT SERPL-CCNC: 22 IU/L (ref 0–44)
AST SERPL-CCNC: 20 IU/L (ref 0–40)
BASOPHILS # BLD AUTO: 0 X10E3/UL (ref 0–0.2)
BASOPHILS NFR BLD AUTO: 1 %
BILIRUB SERPL-MCNC: 0.7 MG/DL (ref 0–1.2)
BUN SERPL-MCNC: 12 MG/DL (ref 6–24)
BUN/CREAT SERPL: 11 (ref 9–20)
CALCIUM SERPL-MCNC: 9.4 MG/DL (ref 8.7–10.2)
CHLORIDE SERPL-SCNC: 99 MMOL/L (ref 96–106)
CHOLEST SERPL-MCNC: 154 MG/DL (ref 100–199)
CO2 SERPL-SCNC: 26 MMOL/L (ref 20–29)
CREAT SERPL-MCNC: 1.11 MG/DL (ref 0.76–1.27)
EGFR: 76 ML/MIN/1.73
EOSINOPHIL # BLD AUTO: 0.1 X10E3/UL (ref 0–0.4)
EOSINOPHIL NFR BLD AUTO: 2 %
ERYTHROCYTE [DISTWIDTH] IN BLOOD BY AUTOMATED COUNT: 13.5 % (ref 11.6–15.4)
EST. AVERAGE GLUCOSE BLD GHB EST-MCNC: 111 MG/DL
GLOBULIN SER CALC-MCNC: 2.8 G/DL (ref 1.5–4.5)
GLUCOSE SERPL-MCNC: 93 MG/DL (ref 65–99)
HBA1C MFR BLD: 5.5 % (ref 4.8–5.6)
HCT VFR BLD AUTO: 47.8 % (ref 37.5–51)
HDLC SERPL-MCNC: 49 MG/DL
HGB BLD-MCNC: 16.1 G/DL (ref 13–17.7)
IMM GRANULOCYTES # BLD AUTO: 0 X10E3/UL (ref 0–0.1)
IMM GRANULOCYTES NFR BLD AUTO: 0 %
LDLC SERPL CALC-MCNC: 80 MG/DL (ref 0–99)
LYMPHOCYTES # BLD AUTO: 2.2 X10E3/UL (ref 0.7–3.1)
LYMPHOCYTES NFR BLD AUTO: 35 %
MCH RBC QN AUTO: 29.8 PG (ref 26.6–33)
MCHC RBC AUTO-ENTMCNC: 33.7 G/DL (ref 31.5–35.7)
MCV RBC AUTO: 89 FL (ref 79–97)
MONOCYTES # BLD AUTO: 0.4 X10E3/UL (ref 0.1–0.9)
MONOCYTES NFR BLD AUTO: 6 %
NEUTROPHILS # BLD AUTO: 3.7 X10E3/UL (ref 1.4–7)
NEUTROPHILS NFR BLD AUTO: 56 %
PLATELET # BLD AUTO: 213 X10E3/UL (ref 150–450)
POTASSIUM SERPL-SCNC: 5.1 MMOL/L (ref 3.5–5.2)
PROT SERPL-MCNC: 7.1 G/DL (ref 6–8.5)
RBC # BLD AUTO: 5.4 X10E6/UL (ref 4.14–5.8)
SODIUM SERPL-SCNC: 138 MMOL/L (ref 134–144)
TRIGL SERPL-MCNC: 145 MG/DL (ref 0–149)
URATE SERPL-MCNC: 6.8 MG/DL (ref 3.8–8.4)
VLDLC SERPL CALC-MCNC: 25 MG/DL (ref 5–40)
WBC # BLD AUTO: 6.4 X10E3/UL (ref 3.4–10.8)

## 2022-09-10 ENCOUNTER — TELEPHONE (OUTPATIENT)
Dept: INTERNAL MEDICINE CLINIC | Age: 57
End: 2022-09-10

## 2022-09-10 DIAGNOSIS — U07.1 COVID-19: Primary | ICD-10-CM

## 2022-10-04 RX ORDER — ALLOPURINOL 100 MG/1
100 TABLET ORAL DAILY
Qty: 90 TABLET | Refills: 1 | Status: SHIPPED | OUTPATIENT
Start: 2022-10-04

## 2022-10-04 NOTE — TELEPHONE ENCOUNTER
Requested Prescriptions     Pending Prescriptions Disp Refills    allopurinoL (ZYLOPRIM) 100 mg tablet       Sig: Take 1 Tablet by mouth daily. [facility]/pharmacy #[id number] [place], [address] [place] [address]  [phone]    Patient is out of this medication.     Ottoniel Chin - [phone]

## 2023-01-04 RX ORDER — METOPROLOL TARTRATE 50 MG/1
TABLET ORAL
Qty: 180 TABLET | Refills: 0 | Status: SHIPPED | OUTPATIENT
Start: 2023-01-04

## 2023-01-04 RX ORDER — SIMVASTATIN 20 MG/1
TABLET, FILM COATED ORAL
Qty: 90 TABLET | Refills: 0 | Status: SHIPPED | OUTPATIENT
Start: 2023-01-04

## 2023-02-14 LAB — CREATININE, EXTERNAL: 1.2

## 2023-02-16 NOTE — PROGRESS NOTES
Assessment/Plan:     1. Benign hypertension  -controlled with metoprolol 50mg bid    - CBC WITH AUTOMATED DIFF; Future  - METABOLIC PANEL, COMPREHENSIVE; Future  - CBC WITH AUTOMATED DIFF  - METABOLIC PANEL, COMPREHENSIVE    2. Mixed hyperlipidemia  -taking simvastatin 20mg daily.     - CBC WITH AUTOMATED DIFF; Future  - METABOLIC PANEL, COMPREHENSIVE; Future  - LIPID PANEL; Future  - CBC WITH AUTOMATED DIFF  - METABOLIC PANEL, COMPREHENSIVE  - LIPID PANEL    3. IFG (impaired fasting glucose)  -maintain low sugar and carbohydrate diet. - METABOLIC PANEL, COMPREHENSIVE; Future  - HEMOGLOBIN A1C WITH EAG; Future  - METABOLIC PANEL, COMPREHENSIVE  - HEMOGLOBIN A1C WITH EAG    4. Gouty arthritis  -taking allopurinol 100mg daily for prophylaxis. - CBC WITH AUTOMATED DIFF; Future  - URIC ACID; Future  - CBC WITH AUTOMATED DIFF  - URIC ACID    5. Encounter for long-term (current) use of medications      6. Prostate cancer screening    - PSA SCREENING (SCREENING); Future  - PSA SCREENING (SCREENING)    7. DVT of axillary vein, acute right (HCC)  -acute DVT. Likely due to immobility from flights. Was started on eliquis. Will need to continue eliquis for 3 months. Once complete course, will do coag evaluation. Follow-up Disposition:     follow up in 1 month for acute DVT              Subjective:      Thomas Daly is a 59 y.o. male who presents today for follow up of his hypertension, hyperlipidemia, IFG and gouty arthritis. Since last visit :  -was diagnosed with DVT on 2/15/2023. He had been traveling with several flights. Was seen at [facility] ER. Had right lower leg pain and swelling. Report of duplex was noted for an acute partial occlusive deep vein thrombosis involving the rifht femoral vein, right popliteal vein, right posterior tibial veins and right peroneal veins      Patient Care Team:  -Dr. Elif Caba - podiatrist       Objective:      Wt Readings from Last 3 Encounters:   05/10/22 217 lb (98.4 kg)   05/28/21 218 lb 3.2 oz (99 kg)   08/31/20 219 lb (99.3 kg)     BP Readings from Last 3 Encounters:   05/10/22 135/82   05/28/21 130/80   08/24/20 130/82     Visit Vitals  /76   Pulse 84   Temp 98.4 °F (36.9 °C) (Temporal)   Resp 16   SpO2 97%     General appearance: alert, cooperative, no distress, appears stated age  Head: Normocephalic, without obvious abnormality, atraumatic  Neck: supple, symmetrical, trachea midline, no adenopathy, no carotid bruit, and no JVD  Lungs: clear to auscultation bilaterally  Heart: regular rate and rhythm, S1, S2 normal, no murmur, click, rub or gallop  Extremities: edema 2+ right lower leg. Disclaimer:  Return if symptoms worsen or fail to improve. Advised patient to call back or return to office if symptoms worsen/change/persist.     Discussed expected course/resolution/complications of diagnosis in detail with patient. Medication risks/benefits/costs/interactions/alternatives discussed with patient. Patient was given an after visit summary which includes diagnoses, current medications, & vitals. Patient expressed understanding with the diagnosis and plan.

## 2023-02-17 ENCOUNTER — OFFICE VISIT (OUTPATIENT)
Dept: INTERNAL MEDICINE CLINIC | Age: 58
End: 2023-02-17
Payer: COMMERCIAL

## 2023-02-17 VITALS
HEART RATE: 84 BPM | DIASTOLIC BLOOD PRESSURE: 76 MMHG | TEMPERATURE: 98.4 F | OXYGEN SATURATION: 97 % | RESPIRATION RATE: 16 BRPM | SYSTOLIC BLOOD PRESSURE: 135 MMHG

## 2023-02-17 DIAGNOSIS — I10 BENIGN HYPERTENSION: Primary | ICD-10-CM

## 2023-02-17 DIAGNOSIS — Z79.899 ENCOUNTER FOR LONG-TERM (CURRENT) USE OF MEDICATIONS: ICD-10-CM

## 2023-02-17 DIAGNOSIS — Z12.5 PROSTATE CANCER SCREENING: ICD-10-CM

## 2023-02-17 DIAGNOSIS — I82.A11 DVT OF AXILLARY VEIN, ACUTE RIGHT (HCC): ICD-10-CM

## 2023-02-17 DIAGNOSIS — E78.2 MIXED HYPERLIPIDEMIA: ICD-10-CM

## 2023-02-17 DIAGNOSIS — M10.9 GOUTY ARTHRITIS: ICD-10-CM

## 2023-02-17 DIAGNOSIS — R73.01 IFG (IMPAIRED FASTING GLUCOSE): ICD-10-CM

## 2023-02-17 RX ORDER — APIXABAN 5 MG (74)
KIT ORAL
COMMUNITY
Start: 2023-02-14

## 2023-02-17 NOTE — PROGRESS NOTES
Verified name and birth date for privacy precautions. Chart reviewed in preparation for today's visit. Chief Complaint   Patient presents with    Hypertension          Health Maintenance Due   Topic    Shingles Vaccine (1 of 2)    COVID-19 Vaccine (3 - Booster for Moderna series)    Flu Vaccine (1)         Wt Readings from Last 3 Encounters:   05/10/22 217 lb (98.4 kg)   05/28/21 218 lb 3.2 oz (99 kg)   08/31/20 219 lb (99.3 kg)     Temp Readings from Last 3 Encounters:   02/17/23 98.4 °F (36.9 °C) (Temporal)   05/10/22 97.5 °F (36.4 °C) (Temporal)   05/28/21 98 °F (36.7 °C) (Temporal)     BP Readings from Last 3 Encounters:   02/17/23 135/76   05/10/22 135/82   05/28/21 130/80     Pulse Readings from Last 3 Encounters:   02/17/23 84   05/10/22 (!) 56   05/28/21 (!) 56         Learning Assessment:  :     Learning Assessment 1/30/2019 7/30/2018 8/1/2014   PRIMARY LEARNER Patient Patient Patient   HIGHEST LEVEL OF EDUCATION - PRIMARY LEARNER  4 YEARS OF COLLEGE 4 YEARS OF COLLEGE 4 YEARS OF COLLEGE   BARRIERS PRIMARY LEARNER NONE NONE NONE   CO-LEARNER CAREGIVER - No No   PRIMARY LANGUAGE ENGLISH ENGLISH ENGLISH    NEED - - No   LEARNER PREFERENCE PRIMARY DEMONSTRATION READING OTHER (COMMENT)   LEARNING SPECIAL TOPICS - - no   ANSWERED BY patient self patinet   RELATIONSHIP SELF SELF SELF       Depression Screening:  :     3 most recent PHQ Screens 2/17/2023   Little interest or pleasure in doing things Not at all   Feeling down, depressed, irritable, or hopeless Not at all   Total Score PHQ 2 0       Fall Risk Assessment:  :     Fall Risk Assessment, last 12 mths 1/30/2019   Able to walk? Yes   Fall in past 12 months? No       Abuse Screening:  :     Abuse Screening Questionnaire 2/17/2023 5/10/2022 1/30/2019 7/30/2018 8/28/2017 12/16/2015 8/1/2014   Do you ever feel afraid of your partner? N N N N N N N   Are you in a relationship with someone who physically or mentally threatens you?  N N N N N N N Is it safe for you to go home?  Jetty Weed

## 2023-02-17 NOTE — PATIENT INSTRUCTIONS
Tylenol arthritis 2 tab every 8 hours    -cold compress alternating with warm compresses 20 minutes each    -elevate legs when sitting.     -compression stockings once swelling improves

## 2023-02-21 ENCOUNTER — TELEPHONE (OUTPATIENT)
Dept: INTERNAL MEDICINE CLINIC | Age: 58
End: 2023-02-21

## 2023-02-21 DIAGNOSIS — I82.491 ACUTE DEEP VEIN THROMBOSIS (DVT) OF OTHER SPECIFIED VEIN OF RIGHT LOWER EXTREMITY (HCC): Primary | ICD-10-CM

## 2023-02-21 RX ORDER — TRAMADOL HYDROCHLORIDE 50 MG/1
50 TABLET ORAL
Qty: 30 TABLET | Refills: 0 | Status: SHIPPED | OUTPATIENT
Start: 2023-02-21 | End: 2023-03-23

## 2023-02-21 NOTE — TELEPHONE ENCOUNTER
2/21/2023 spoke with patient.     -has acute DVT from right femoral artery , popliteal, posterior tibial and peroneal veins. Swelling is improving. Taking tylenol arthritis, but still having significant pain in his leg. Will give tramadol 50mg tid prn for pain. Discussed with patient to use this sparingly. This is a controlled medication. Discussed precautions of its use. he states understanding and agrees with plan. Orders Placed This Encounter    traMADoL (ULTRAM) 50 mg tablet     Sig: Take 1 Tablet by mouth every eight (8) hours as needed for Pain for up to 30 days. Max Daily Amount: 150 mg. Dispense:  30 Tablet     Refill:  0       reviewed.

## 2023-02-21 NOTE — TELEPHONE ENCOUNTER
Reason for call:  TC from pt - pt states he has been taking Tylenol Arthritis for pain, but it is not working - Pt inquiring if there is something else he can take in it's place for pain.     Is this a new problem: no     Date of last appointment:  2/17/2023     Can we respond via [employer]: no    Best call back number: [phone]

## 2023-02-21 NOTE — TELEPHONE ENCOUNTER
Spoke with patient. He is having pain and swelling in right foot and left knee. He is on blood thinners for blood clot but most of the swelling has gone away from that. . he believes remaining swelling is due to gout. He takes allopurinol daily but thinks he needs something more for pain.  Please advise

## 2023-03-15 ENCOUNTER — TELEPHONE (OUTPATIENT)
Dept: INTERNAL MEDICINE CLINIC | Age: 58
End: 2023-03-15

## 2023-03-15 PROBLEM — Z86.718 HISTORY OF DVT (DEEP VEIN THROMBOSIS): Status: ACTIVE | Noted: 2023-03-15

## 2023-03-15 NOTE — TELEPHONE ENCOUNTER
3/15/2023 spoke with patient.     -patient had DVT due to series of long flights in early February, 2023. Was started on eliquis at that time, mid February, at his ER visit. -need refill of his eliquis. -he has upcoming flights and would like to know if it is safe to fly at this time.   -also asking when can he stop eliquis. Plan:  -recommend use of anticoagulation for 3 months. - complete course in May, 2023. Recommend he schedule follow up at that time.   -eliquis refilled today  -may travel by flight. Recommended wearing compression stockings and frequent movement of his legs and feet. Orders Placed This Encounter    apixaban (ELIQUIS) 5 mg tablet     Sig: Take 1 Tablet by mouth two (2) times a day. Dispense:  90 Tablet     Refill:  0   .     he states understanding and agrees with plan.

## 2023-03-15 NOTE — TELEPHONE ENCOUNTER
Patient states he would like to ask Dr. Maryjane Rios a couple of questions related to the blood clot he had in his leg 4 weeks ago. States Dr. Maryjane Rios is aware of that already. Patient states he does not use [employer]. Please call.

## 2023-04-26 ENCOUNTER — TELEPHONE (OUTPATIENT)
Dept: INTERNAL MEDICINE CLINIC | Age: 58
End: 2023-04-26

## 2023-04-26 NOTE — TELEPHONE ENCOUNTER
Patient requesting callback from the nurse concerning labwork he needs to have done before his upcoming appt. Wants to make sure there isn't anything else Dr. Hiram Breaux wants to add to the existing order since he's had a blood clot in the meantime. Please call.

## 2023-04-27 ENCOUNTER — TELEPHONE (OUTPATIENT)
Dept: INTERNAL MEDICINE CLINIC | Age: 58
End: 2023-04-27

## 2023-04-27 NOTE — TELEPHONE ENCOUNTER
Please let him know that nothing more needs to be added to the lab slip given to him in February.      Tone Moore MD

## 2023-04-27 NOTE — TELEPHONE ENCOUNTER
Reason for call:  Second called   Patient requesting callback from the nurse concerning labwork he needs to have done before his upcoming appt. Wants to make sure there isn't anything else Dr. Constantin Martínez wants to add to the existing order since he's had a blood clot in the meantime. Please call.     Is this a new problem: yes     Date of last appointment:  2/17/2023     Can we respond via [employer]: no    Best call back number: Lorenzonico Echols    [phone]

## 2023-05-02 LAB
ALBUMIN SERPL-MCNC: 4.4 G/DL (ref 3.8–4.9)
ALBUMIN/GLOB SERPL: 1.9 {RATIO} (ref 1.2–2.2)
ALP SERPL-CCNC: 82 IU/L (ref 44–121)
ALT SERPL-CCNC: 22 IU/L (ref 0–44)
AST SERPL-CCNC: 20 IU/L (ref 0–40)
BASOPHILS # BLD AUTO: 0.1 X10E3/UL (ref 0–0.2)
BASOPHILS NFR BLD AUTO: 1 %
BILIRUB SERPL-MCNC: 0.6 MG/DL (ref 0–1.2)
BUN SERPL-MCNC: 10 MG/DL (ref 8–27)
BUN/CREAT SERPL: 9 (ref 10–24)
CALCIUM SERPL-MCNC: 9.2 MG/DL (ref 8.6–10.2)
CHLORIDE SERPL-SCNC: 101 MMOL/L (ref 96–106)
CHOLEST SERPL-MCNC: 174 MG/DL (ref 100–199)
CO2 SERPL-SCNC: 26 MMOL/L (ref 20–29)
CREAT SERPL-MCNC: 1.09 MG/DL (ref 0.76–1.27)
EGFRCR SERPLBLD CKD-EPI 2021: 78 ML/MIN/1.73
EOSINOPHIL # BLD AUTO: 0.2 X10E3/UL (ref 0–0.4)
EOSINOPHIL NFR BLD AUTO: 2 %
ERYTHROCYTE [DISTWIDTH] IN BLOOD BY AUTOMATED COUNT: 14.3 % (ref 11.6–15.4)
EST. AVERAGE GLUCOSE BLD GHB EST-MCNC: 114 MG/DL
GLOBULIN SER CALC-MCNC: 2.3 G/DL (ref 1.5–4.5)
GLUCOSE SERPL-MCNC: 85 MG/DL (ref 70–99)
HBA1C MFR BLD: 5.6 % (ref 4.8–5.6)
HCT VFR BLD AUTO: 45.1 % (ref 37.5–51)
HDLC SERPL-MCNC: 47 MG/DL
HGB BLD-MCNC: 15.2 G/DL (ref 13–17.7)
IMM GRANULOCYTES # BLD AUTO: 0 X10E3/UL (ref 0–0.1)
IMM GRANULOCYTES NFR BLD AUTO: 0 %
LDLC SERPL CALC-MCNC: 96 MG/DL (ref 0–99)
LYMPHOCYTES # BLD AUTO: 2.5 X10E3/UL (ref 0.7–3.1)
LYMPHOCYTES NFR BLD AUTO: 30 %
MCH RBC QN AUTO: 29.7 PG (ref 26.6–33)
MCHC RBC AUTO-ENTMCNC: 33.7 G/DL (ref 31.5–35.7)
MCV RBC AUTO: 88 FL (ref 79–97)
MONOCYTES # BLD AUTO: 0.5 X10E3/UL (ref 0.1–0.9)
MONOCYTES NFR BLD AUTO: 6 %
NEUTROPHILS # BLD AUTO: 5.2 X10E3/UL (ref 1.4–7)
NEUTROPHILS NFR BLD AUTO: 61 %
PLATELET # BLD AUTO: 184 X10E3/UL (ref 150–450)
POTASSIUM SERPL-SCNC: 4.7 MMOL/L (ref 3.5–5.2)
PROT SERPL-MCNC: 6.7 G/DL (ref 6–8.5)
PSA SERPL-MCNC: 1.5 NG/ML (ref 0–4)
RBC # BLD AUTO: 5.12 X10E6/UL (ref 4.14–5.8)
SODIUM SERPL-SCNC: 139 MMOL/L (ref 134–144)
TRIGL SERPL-MCNC: 177 MG/DL (ref 0–149)
URATE SERPL-MCNC: 6.3 MG/DL (ref 3.8–8.4)
VLDLC SERPL CALC-MCNC: 31 MG/DL (ref 5–40)
WBC # BLD AUTO: 8.5 X10E3/UL (ref 3.4–10.8)

## 2023-05-04 ENCOUNTER — OFFICE VISIT (OUTPATIENT)
Dept: INTERNAL MEDICINE CLINIC | Age: 58
End: 2023-05-04
Payer: COMMERCIAL

## 2023-05-04 VITALS
SYSTOLIC BLOOD PRESSURE: 133 MMHG | DIASTOLIC BLOOD PRESSURE: 80 MMHG | HEART RATE: 60 BPM | TEMPERATURE: 98 F | OXYGEN SATURATION: 96 % | RESPIRATION RATE: 16 BRPM | WEIGHT: 214 LBS | HEIGHT: 69 IN | BODY MASS INDEX: 31.7 KG/M2

## 2023-05-04 DIAGNOSIS — R73.01 IFG (IMPAIRED FASTING GLUCOSE): ICD-10-CM

## 2023-05-04 DIAGNOSIS — E78.2 MIXED HYPERLIPIDEMIA: ICD-10-CM

## 2023-05-04 DIAGNOSIS — Z86.718 HISTORY OF DVT OF LOWER EXTREMITY: Primary | ICD-10-CM

## 2023-05-04 DIAGNOSIS — I10 BENIGN HYPERTENSION: ICD-10-CM

## 2023-05-04 DIAGNOSIS — Z79.899 ENCOUNTER FOR LONG-TERM (CURRENT) USE OF MEDICATIONS: ICD-10-CM

## 2023-05-04 PROCEDURE — 99214 OFFICE O/P EST MOD 30 MIN: CPT | Performed by: INTERNAL MEDICINE

## 2023-05-19 RX ORDER — METOPROLOL TARTRATE 50 MG/1
1 TABLET, FILM COATED ORAL 2 TIMES DAILY
COMMUNITY
Start: 2023-03-30

## 2023-05-19 RX ORDER — ALLOPURINOL 100 MG/1
1 TABLET ORAL DAILY
COMMUNITY
Start: 2023-04-03

## 2023-05-19 RX ORDER — SIMVASTATIN 20 MG
1 TABLET ORAL DAILY
COMMUNITY
Start: 2023-03-30

## 2023-08-24 ENCOUNTER — TELEPHONE (OUTPATIENT)
Age: 58
End: 2023-08-24

## 2023-08-24 ENCOUNTER — PATIENT MESSAGE (OUTPATIENT)
Age: 58
End: 2023-08-24

## 2023-08-24 RX ORDER — ALLOPURINOL 100 MG/1
200 TABLET ORAL DAILY
Qty: 180 TABLET | Refills: 1 | Status: SHIPPED | OUTPATIENT
Start: 2023-08-24

## 2023-08-24 NOTE — TELEPHONE ENCOUNTER
Medication Refill Request    Terrel Apley is requesting a refill of the following medication(s):   allopurinol (ZYLOPRIM) 100 MG tablet                          Please send refill to:      [facility]/pharmacy #[id number]- [facility], [address] [place] [address] [phone] - f [phone] (Pharmacy)
General

## 2023-08-24 NOTE — TELEPHONE ENCOUNTER
From: Marylu Kocher  To: Dr. Medina Public  Sent: 8/24/2023 10:09 AM EDT  Subject: Allopurinol    Dr. Cookie Evangelista-    I am leaving for vacation for 2 weeks early Saturday morning. [facility] has sent a request to refill my Allopurinol prescription. Can you please refill as soon as possible. Remember, I increased my dosage to 2 tablets every day and that is why it as run out.     Thanks,    Beatrice Hull

## 2023-08-29 LAB
F5 P.R506Q BLD/T QL: NORMAL
IMP & REVIEW OF LAB RESULTS: NORMAL

## 2023-09-28 RX ORDER — METOPROLOL TARTRATE 50 MG/1
50 TABLET, FILM COATED ORAL 2 TIMES DAILY
Qty: 180 TABLET | Refills: 0 | Status: SHIPPED | OUTPATIENT
Start: 2023-09-28

## 2023-09-29 RX ORDER — SIMVASTATIN 20 MG
20 TABLET ORAL DAILY
Qty: 90 TABLET | Refills: 1 | OUTPATIENT
Start: 2023-09-29

## 2023-09-29 NOTE — TELEPHONE ENCOUNTER
Medication Refill Request    Leon Jeff is requesting a refill of the following medication(s):   metoprolol tartrate (LOPRESSOR) 50 MG tablet  Please send refill to:     [facility]/pharmacy #[id number] Myron Wu Kim BUBBA [phone]  [address] [facility]  Phone: [phone] Fax: [phone]     Pt will be going out of the country next Tuesday.

## 2023-10-02 ENCOUNTER — TELEPHONE (OUTPATIENT)
Age: 58
End: 2023-10-02

## 2023-10-02 NOTE — TELEPHONE ENCOUNTER
Reason for call:  Patient called to ask if he could get a response to his [employer] message from this morning. He is leaving on a cruise tomorrow and had a question about whether he should do anything other than wear compression socks on the flight and take his daily aspirin.     Is this a new problem: Yes    Date of last appointment:  5/4/2023     Can we respond via [employer]: Yes    Best call back number:    [phone]

## 2023-10-08 RX ORDER — ALLOPURINOL 100 MG/1
100 TABLET ORAL DAILY
Qty: 90 TABLET | Refills: 1 | OUTPATIENT
Start: 2023-10-08

## 2023-10-19 RX ORDER — SIMVASTATIN 20 MG
20 TABLET ORAL DAILY
Qty: 90 TABLET | Refills: 1 | Status: SHIPPED | OUTPATIENT
Start: 2023-10-19

## 2023-10-19 NOTE — TELEPHONE ENCOUNTER
Patient has a pending refill request for Simvastatin. He is requesting this prescription be sent in today because he has been out of this medication. He said he has been trying since Monday to get it refilled. Advised him that today was the first time we received a request for a refill.     Macario Martinez - [phone]

## 2023-12-26 RX ORDER — METOPROLOL TARTRATE 50 MG/1
50 TABLET, FILM COATED ORAL 2 TIMES DAILY
Qty: 180 TABLET | Refills: 0 | Status: SHIPPED | OUTPATIENT
Start: 2023-12-26

## 2024-01-08 ENCOUNTER — TELEPHONE (OUTPATIENT)
Age: 59
End: 2024-01-08

## 2024-01-08 NOTE — TELEPHONE ENCOUNTER
----- Message from Kirit Monterroso sent at 1/8/2024 11:01 AM EST -----  Subject: Message to Provider    QUESTIONS  Information for Provider? pt requested to be on waitlist for appt earlier   than july 2nd for check up   ---------------------------------------------------------------------------  --------------  CALL BACK INFO  [phone]; OK to leave message on voicemail  ---------------------------------------------------------------------------  --------------  SCRIPT ANSWERS  Relationship to Patient? Self

## 2024-01-09 NOTE — TELEPHONE ENCOUNTER
Left vm for patient that we do not have a wait list, but he can call us back to check if we've had any cancellations.

## 2024-02-19 RX ORDER — ALLOPURINOL 100 MG/1
200 TABLET ORAL DAILY
Qty: 180 TABLET | Refills: 1 | Status: SHIPPED | OUTPATIENT
Start: 2024-02-19

## 2024-04-01 RX ORDER — METOPROLOL TARTRATE 50 MG/1
50 TABLET, FILM COATED ORAL 2 TIMES DAILY
Qty: 180 TABLET | Refills: 0 | Status: SHIPPED | OUTPATIENT
Start: 2024-04-01

## 2024-04-01 NOTE — TELEPHONE ENCOUNTER
Rx sent to pharmacy as previously filled and verified by Verbal Order Read Back with provider.    NV 07/08/2024

## 2024-04-17 RX ORDER — SIMVASTATIN 20 MG
20 TABLET ORAL DAILY
Qty: 90 TABLET | Refills: 1 | Status: SHIPPED | OUTPATIENT
Start: 2024-04-17

## 2024-07-02 RX ORDER — METOPROLOL TARTRATE 50 MG/1
50 TABLET, FILM COATED ORAL 2 TIMES DAILY
Qty: 180 TABLET | Refills: 0 | Status: SHIPPED | OUTPATIENT
Start: 2024-07-02

## 2024-08-20 RX ORDER — ALLOPURINOL 100 MG/1
200 TABLET ORAL DAILY
Qty: 180 TABLET | Refills: 1 | OUTPATIENT
Start: 2024-08-20

## 2024-09-27 RX ORDER — METOPROLOL TARTRATE 50 MG
50 TABLET ORAL 2 TIMES DAILY
Qty: 180 TABLET | Refills: 0 | OUTPATIENT
Start: 2024-09-27